# Patient Record
Sex: MALE | Race: ASIAN | NOT HISPANIC OR LATINO
[De-identification: names, ages, dates, MRNs, and addresses within clinical notes are randomized per-mention and may not be internally consistent; named-entity substitution may affect disease eponyms.]

---

## 2021-11-16 PROBLEM — Z00.00 ENCOUNTER FOR PREVENTIVE HEALTH EXAMINATION: Status: ACTIVE | Noted: 2021-11-16

## 2021-11-22 ENCOUNTER — APPOINTMENT (OUTPATIENT)
Dept: HEMATOLOGY ONCOLOGY | Facility: CLINIC | Age: 76
End: 2021-11-22

## 2021-12-08 ENCOUNTER — APPOINTMENT (OUTPATIENT)
Dept: HEMATOLOGY ONCOLOGY | Facility: CLINIC | Age: 76
End: 2021-12-08
Payer: MEDICARE

## 2021-12-08 ENCOUNTER — NON-APPOINTMENT (OUTPATIENT)
Age: 76
End: 2021-12-08

## 2021-12-08 VITALS
RESPIRATION RATE: 16 BRPM | DIASTOLIC BLOOD PRESSURE: 70 MMHG | SYSTOLIC BLOOD PRESSURE: 130 MMHG | HEART RATE: 68 BPM | WEIGHT: 149 LBS | OXYGEN SATURATION: 98 % | HEIGHT: 65 IN | TEMPERATURE: 97.5 F | BODY MASS INDEX: 24.83 KG/M2

## 2021-12-08 DIAGNOSIS — Z80.3 FAMILY HISTORY OF MALIGNANT NEOPLASM OF BREAST: ICD-10-CM

## 2021-12-08 DIAGNOSIS — Z80.0 FAMILY HISTORY OF MALIGNANT NEOPLASM OF DIGESTIVE ORGANS: ICD-10-CM

## 2021-12-08 DIAGNOSIS — Z85.46 PERSONAL HISTORY OF MALIGNANT NEOPLASM OF PROSTATE: ICD-10-CM

## 2021-12-08 PROCEDURE — 85025 COMPLETE CBC W/AUTO DIFF WBC: CPT

## 2021-12-08 PROCEDURE — 99204 OFFICE O/P NEW MOD 45 MIN: CPT

## 2021-12-09 LAB
COVID-19 SPIKE DOMAIN ANTIBODY INTERPRETATION: POSITIVE
ERYTHROCYTE [SEDIMENTATION RATE] IN BLOOD BY WESTERGREN METHOD: 11 MM/HR
SARS-COV-2 AB SERPL IA-ACNC: >250 U/ML

## 2021-12-15 ENCOUNTER — INPATIENT (INPATIENT)
Facility: HOSPITAL | Age: 76
LOS: 0 days | Discharge: HOME | End: 2021-12-16
Attending: HOSPITALIST | Admitting: HOSPITALIST
Payer: MEDICARE

## 2021-12-15 VITALS
TEMPERATURE: 98 F | SYSTOLIC BLOOD PRESSURE: 149 MMHG | HEART RATE: 62 BPM | OXYGEN SATURATION: 99 % | DIASTOLIC BLOOD PRESSURE: 73 MMHG | RESPIRATION RATE: 18 BRPM

## 2021-12-15 DIAGNOSIS — Z29.9 ENCOUNTER FOR PROPHYLACTIC MEASURES, UNSPECIFIED: ICD-10-CM

## 2021-12-15 DIAGNOSIS — K62.5 HEMORRHAGE OF ANUS AND RECTUM: ICD-10-CM

## 2021-12-15 DIAGNOSIS — K92.2 GASTROINTESTINAL HEMORRHAGE, UNSPECIFIED: ICD-10-CM

## 2021-12-15 DIAGNOSIS — C61 MALIGNANT NEOPLASM OF PROSTATE: ICD-10-CM

## 2021-12-15 LAB
ANION GAP SERPL CALC-SCNC: 10 MMOL/L — SIGNIFICANT CHANGE UP (ref 7–14)
ANISOCYTOSIS BLD QL: SLIGHT — SIGNIFICANT CHANGE UP
APTT BLD: 41.1 SEC — HIGH (ref 27–36.3)
BASOPHILS # BLD AUTO: 0 K/UL — SIGNIFICANT CHANGE UP (ref 0–0.2)
BASOPHILS # BLD AUTO: 0.03 K/UL — SIGNIFICANT CHANGE UP (ref 0–0.2)
BASOPHILS NFR BLD AUTO: 0 % — SIGNIFICANT CHANGE UP (ref 0–2)
BASOPHILS NFR BLD AUTO: 0.4 % — SIGNIFICANT CHANGE UP (ref 0–2)
BLD GP AB SCN SERPL QL: NEGATIVE — SIGNIFICANT CHANGE UP
BUN SERPL-MCNC: 14 MG/DL — SIGNIFICANT CHANGE UP (ref 7–23)
CALCIUM SERPL-MCNC: 9.4 MG/DL — SIGNIFICANT CHANGE UP (ref 8.4–10.5)
CHLORIDE SERPL-SCNC: 104 MMOL/L — SIGNIFICANT CHANGE UP (ref 98–107)
CO2 SERPL-SCNC: 25 MMOL/L — SIGNIFICANT CHANGE UP (ref 22–31)
CREAT SERPL-MCNC: 0.79 MG/DL — SIGNIFICANT CHANGE UP (ref 0.5–1.3)
EOSINOPHIL # BLD AUTO: 0.2 K/UL — SIGNIFICANT CHANGE UP (ref 0–0.5)
EOSINOPHIL # BLD AUTO: 0.24 K/UL — SIGNIFICANT CHANGE UP (ref 0–0.5)
EOSINOPHIL NFR BLD AUTO: 2.6 % — SIGNIFICANT CHANGE UP (ref 0–6)
EOSINOPHIL NFR BLD AUTO: 3.4 % — SIGNIFICANT CHANGE UP (ref 0–6)
GLUCOSE SERPL-MCNC: 110 MG/DL — HIGH (ref 70–99)
HCT VFR BLD CALC: 42.8 % — SIGNIFICANT CHANGE UP (ref 39–50)
HCT VFR BLD CALC: 48.9 % — SIGNIFICANT CHANGE UP (ref 39–50)
HGB BLD-MCNC: 15 G/DL — SIGNIFICANT CHANGE UP (ref 13–17)
HGB BLD-MCNC: 16.7 G/DL — SIGNIFICANT CHANGE UP (ref 13–17)
IANC: 3.59 K/UL — SIGNIFICANT CHANGE UP (ref 1.5–8.5)
IANC: 3.7 K/UL — SIGNIFICANT CHANGE UP (ref 1.5–8.5)
IMM GRANULOCYTES NFR BLD AUTO: 0.3 % — SIGNIFICANT CHANGE UP (ref 0–1.5)
INR BLD: 1.05 RATIO — SIGNIFICANT CHANGE UP (ref 0.88–1.16)
LYMPHOCYTES # BLD AUTO: 2.71 K/UL — SIGNIFICANT CHANGE UP (ref 1–3.3)
LYMPHOCYTES # BLD AUTO: 3.45 K/UL — HIGH (ref 1–3.3)
LYMPHOCYTES # BLD AUTO: 38 % — SIGNIFICANT CHANGE UP (ref 13–44)
LYMPHOCYTES # BLD AUTO: 44.4 % — HIGH (ref 13–44)
MACROCYTES BLD QL: SLIGHT — SIGNIFICANT CHANGE UP
MAGNESIUM SERPL-MCNC: 2.1 MG/DL — SIGNIFICANT CHANGE UP (ref 1.6–2.6)
MCHC RBC-ENTMCNC: 32.1 PG — SIGNIFICANT CHANGE UP (ref 27–34)
MCHC RBC-ENTMCNC: 32.4 PG — SIGNIFICANT CHANGE UP (ref 27–34)
MCHC RBC-ENTMCNC: 34.2 GM/DL — SIGNIFICANT CHANGE UP (ref 32–36)
MCHC RBC-ENTMCNC: 35 GM/DL — SIGNIFICANT CHANGE UP (ref 32–36)
MCV RBC AUTO: 91.5 FL — SIGNIFICANT CHANGE UP (ref 80–100)
MCV RBC AUTO: 95 FL — SIGNIFICANT CHANGE UP (ref 80–100)
MONOCYTES # BLD AUTO: 0.55 K/UL — SIGNIFICANT CHANGE UP (ref 0–0.9)
MONOCYTES # BLD AUTO: 0.61 K/UL — SIGNIFICANT CHANGE UP (ref 0–0.9)
MONOCYTES NFR BLD AUTO: 7.7 % — SIGNIFICANT CHANGE UP (ref 2–14)
MONOCYTES NFR BLD AUTO: 7.8 % — SIGNIFICANT CHANGE UP (ref 2–14)
NEUTROPHILS # BLD AUTO: 3.04 K/UL — SIGNIFICANT CHANGE UP (ref 1.8–7.4)
NEUTROPHILS # BLD AUTO: 3.59 K/UL — SIGNIFICANT CHANGE UP (ref 1.8–7.4)
NEUTROPHILS NFR BLD AUTO: 39.1 % — LOW (ref 43–77)
NEUTROPHILS NFR BLD AUTO: 50.2 % — SIGNIFICANT CHANGE UP (ref 43–77)
NRBC # BLD: 0 /100 WBCS — SIGNIFICANT CHANGE UP
NRBC # FLD: 0 K/UL — SIGNIFICANT CHANGE UP
OVALOCYTES BLD QL SMEAR: SLIGHT — SIGNIFICANT CHANGE UP
PHOSPHATE SERPL-MCNC: 3.6 MG/DL — SIGNIFICANT CHANGE UP (ref 2.5–4.5)
PLAT MORPH BLD: NORMAL — SIGNIFICANT CHANGE UP
PLATELET # BLD AUTO: 203 K/UL — SIGNIFICANT CHANGE UP (ref 150–400)
PLATELET # BLD AUTO: 219 K/UL — SIGNIFICANT CHANGE UP (ref 150–400)
PLATELET COUNT - ESTIMATE: NORMAL — SIGNIFICANT CHANGE UP
POLYCHROMASIA BLD QL SMEAR: SLIGHT — SIGNIFICANT CHANGE UP
POTASSIUM SERPL-MCNC: 4.1 MMOL/L — SIGNIFICANT CHANGE UP (ref 3.5–5.3)
POTASSIUM SERPL-SCNC: 4.1 MMOL/L — SIGNIFICANT CHANGE UP (ref 3.5–5.3)
PROTHROM AB SERPL-ACNC: 12 SEC — SIGNIFICANT CHANGE UP (ref 10.6–13.6)
RBC # BLD: 4.68 M/UL — SIGNIFICANT CHANGE UP (ref 4.2–5.8)
RBC # BLD: 5.15 M/UL — SIGNIFICANT CHANGE UP (ref 4.2–5.8)
RBC # FLD: 11.9 % — SIGNIFICANT CHANGE UP (ref 10.3–14.5)
RBC # FLD: 12.1 % — SIGNIFICANT CHANGE UP (ref 10.3–14.5)
RBC BLD AUTO: ABNORMAL
RH IG SCN BLD-IMP: POSITIVE — SIGNIFICANT CHANGE UP
SARS-COV-2 RNA SPEC QL NAA+PROBE: SIGNIFICANT CHANGE UP
SODIUM SERPL-SCNC: 139 MMOL/L — SIGNIFICANT CHANGE UP (ref 135–145)
VARIANT LYMPHS # BLD: 6.1 % — HIGH (ref 0–6)
WBC # BLD: 7.14 K/UL — SIGNIFICANT CHANGE UP (ref 3.8–10.5)
WBC # BLD: 7.77 K/UL — SIGNIFICANT CHANGE UP (ref 3.8–10.5)
WBC # FLD AUTO: 7.14 K/UL — SIGNIFICANT CHANGE UP (ref 3.8–10.5)
WBC # FLD AUTO: 7.77 K/UL — SIGNIFICANT CHANGE UP (ref 3.8–10.5)

## 2021-12-15 PROCEDURE — 74174 CTA ABD&PLVS W/CONTRAST: CPT | Mod: 26,MG

## 2021-12-15 PROCEDURE — G1004: CPT

## 2021-12-15 PROCEDURE — 99223 1ST HOSP IP/OBS HIGH 75: CPT

## 2021-12-15 PROCEDURE — 99285 EMERGENCY DEPT VISIT HI MDM: CPT

## 2021-12-15 RX ORDER — LANOLIN ALCOHOL/MO/W.PET/CERES
3 CREAM (GRAM) TOPICAL AT BEDTIME
Refills: 0 | Status: DISCONTINUED | OUTPATIENT
Start: 2021-12-15 | End: 2021-12-16

## 2021-12-15 RX ORDER — ONDANSETRON 8 MG/1
4 TABLET, FILM COATED ORAL EVERY 8 HOURS
Refills: 0 | Status: DISCONTINUED | OUTPATIENT
Start: 2021-12-15 | End: 2021-12-16

## 2021-12-15 RX ORDER — SODIUM CHLORIDE 9 MG/ML
1000 INJECTION, SOLUTION INTRAVENOUS
Refills: 0 | Status: DISCONTINUED | OUTPATIENT
Start: 2021-12-16 | End: 2021-12-16

## 2021-12-15 RX ORDER — ACETAMINOPHEN 500 MG
650 TABLET ORAL EVERY 6 HOURS
Refills: 0 | Status: DISCONTINUED | OUTPATIENT
Start: 2021-12-15 | End: 2021-12-16

## 2021-12-15 NOTE — ED PROVIDER NOTE - ATTENDING CONTRIBUTION TO CARE
76 year old male with a history of prostate ca came to the ED because of bloody BM since yesterday. NO fever, no chest pain, no sob, no nausea, no vomiting, no abd pain, no back pain. On exam: vitals noted,  aaox3, lungs cta, abd soft, nt, labs and CTA pending.

## 2021-12-15 NOTE — H&P ADULT - NSHPLABSRESULTS_GEN_ALL_CORE
12-15    139  |  104  |  14  ----------------------------<  110<H>  4.1   |  25  |  0.79    Ca    9.4      15 Dec 2021 10:58  Phos  3.6     12-15  Mg     2.10     12-15                       15.0   7.77  )-----------( 203      ( 15 Dec 2021 16:17 )             42.8   PT/INR - ( 15 Dec 2021 10:58 )   PT: 12.0 sec;   INR: 1.05 ratio      PTT - ( 15 Dec 2021 10:58 )  PTT:41.1 sec    EKG Pending

## 2021-12-15 NOTE — ED ADULT NURSE REASSESSMENT NOTE - NS ED NURSE REASSESS COMMENT FT1
received report from Rn Jesus Alberto. pt resting comfortably. denies any acute complaints. states he has had no evidence of rectal bleeding since arrival to ED. resps are even and unlabored, spo2 100%.

## 2021-12-15 NOTE — ED ADULT TRIAGE NOTE - CHIEF COMPLAINT QUOTE
states" I am having blood in my stools since last night with pain in my rectum". denies use of any blood thinners.

## 2021-12-15 NOTE — H&P ADULT - PROBLEM SELECTOR PLAN 1
Likely diverticular vs angiodysplasia  -NPO midnight  -GI consult to follow  -Clear liquids for now  -IVF after midnight  -H/H q8H

## 2021-12-15 NOTE — H&P ADULT - HISTORY OF PRESENT ILLNESS
77yo male with PMH of prostate cancer (radiation/seeds 2001 - no seq) presents to the ED with blood in stool since last night. Pt reports that the blood is bright red and had clots x 2 times last night and this AM has another 2 BMs with less red blood. Bleeding only occurs during bowl movements. Pt is not on blood thinners. Pt denies h/o of hemorrhoids. Denies nausea, vomiting, fever, abdominal pain or rectal pain, lightheadedness and syncope. Last colonoscopy was 2 years ago. Unsure if he had diverticulosis last scope.  In the ED, VSS. Hgb stable. CTA without acute bleed.

## 2021-12-15 NOTE — ED PROVIDER NOTE - CLINICAL SUMMARY MEDICAL DECISION MAKING FREE TEXT BOX
75yo male with PMH of prostate exam presents to the ED with multiple episodes of heavy rectal bleeding.  Likely lower GI bleed, r/o active bleed  Labs: CBC, CMP, coags, type and screen  imaging: CTA

## 2021-12-15 NOTE — ED PROVIDER NOTE - OBJECTIVE STATEMENT
75yo male with PMH of prostate cancer presents to the ED with blood in stool since last night. Pt reports that the blood is bright red and has clots. Pt has had 4 bowl movements since last night. According to him, his first two movements had a copious amount of blood and the subsequent two had much less. Bleeding only occurs during bowl movements. Pt is not on blood thinners. Pt denies h/o of hemorrhoids. Denies nausea, vomiting, fever, abdominal pain or rectal pain. 75yo male with PMH of prostate cancer (radiation/seeds 2001 - no seq) presents to the ED with blood in stool since last night. Pt reports that the blood is bright red and has clots. Pt has had 4 bowl movements since last night. According to him, his first two movements had a copious amount of blood and the subsequent two had much less. Bleeding only occurs during bowl movements. Pt is not on blood thinners. Pt denies h/o of hemorrhoids. Denies nausea, vomiting, fever, abdominal pain or rectal pain.

## 2021-12-15 NOTE — ED ADULT NURSE NOTE - OBJECTIVE STATEMENT
Received pt to bed , A+Ox4, ambulatory. C/O bleeding, pt reports bright red blood, from rectum since last night with bowel movements, pt states with every bowel movement (4) bleeding has gotten less, now only when wiping. pt endorsing weakness since last night. Respirations even and unlabored, normal work of breathing, no accessory muscle use, speaking in full clear uninterrupted sentences. ABD is soft, non tender, non distended. Pt denies any chest pain, SOB, headache, dizziness, N/V/D, fever, chills.  20G to LAC, Labs sent, will continue to monitor.

## 2021-12-15 NOTE — H&P ADULT - ASSESSMENT
75yo male with PMH of prostate cancer (radiation/seeds 2001 - no seq) presents to the ED with blood in stool since last night now admitted for acute LGIB.

## 2021-12-15 NOTE — H&P ADULT - NSHPREVIEWOFSYSTEMS_GEN_ALL_CORE
REVIEW OF SYSTEMS:    CONSTITUTIONAL: No weakness, fevers or chills  EYES/ENT: No visual changes;  No dysphagia  NECK: No pain or stiffness  RESPIRATORY: No cough, wheezing, hemoptysis; No shortness of breath  CARDIOVASCULAR: No chest pain or palpitations; No lower extremity edema  GASTROINTESTINAL: No abdominal or epigastric pain. No nausea, vomiting, or hematemesis; No diarrhea or constipation. +hematochezia.  GENITOURINARY: No dysuria, frequency or hematuria  NEUROLOGICAL: No numbness or weakness  SKIN: No itching, burning, rashes, or lesions   PSYCH: No auditory or visual hallucinations  All other review of systems is negative unless indicated above.

## 2021-12-15 NOTE — PATIENT PROFILE ADULT - FALL HARM RISK - HARM RISK INTERVENTIONS

## 2021-12-15 NOTE — ED ADULT NURSE NOTE - NSIMPLEMENTINTERV_GEN_ALL_ED
Implemented All Universal Safety Interventions:  [place] to call system. Call bell, personal items and telephone within reach. Instruct patient to call for assistance. Room bathroom lighting operational. Non-slip footwear when patient is off stretcher. Physically safe environment: no spills, clutter or unnecessary equipment. Stretcher in lowest position, wheels locked, appropriate side rails in place.

## 2021-12-16 ENCOUNTER — TRANSCRIPTION ENCOUNTER (OUTPATIENT)
Age: 76
End: 2021-12-16

## 2021-12-16 LAB
ALBUMIN SERPL ELPH-MCNC: 4 G/DL — SIGNIFICANT CHANGE UP (ref 3.3–5)
ALP SERPL-CCNC: 49 U/L — SIGNIFICANT CHANGE UP (ref 40–120)
ALT FLD-CCNC: 19 U/L — SIGNIFICANT CHANGE UP (ref 4–41)
ANION GAP SERPL CALC-SCNC: 12 MMOL/L — SIGNIFICANT CHANGE UP (ref 7–14)
AST SERPL-CCNC: 20 U/L — SIGNIFICANT CHANGE UP (ref 4–40)
BILIRUB SERPL-MCNC: 0.7 MG/DL — SIGNIFICANT CHANGE UP (ref 0.2–1.2)
BUN SERPL-MCNC: 11 MG/DL — SIGNIFICANT CHANGE UP (ref 7–23)
CALCIUM SERPL-MCNC: 8.8 MG/DL — SIGNIFICANT CHANGE UP (ref 8.4–10.5)
CHLORIDE SERPL-SCNC: 103 MMOL/L — SIGNIFICANT CHANGE UP (ref 98–107)
CO2 SERPL-SCNC: 21 MMOL/L — LOW (ref 22–31)
CREAT SERPL-MCNC: 0.74 MG/DL — SIGNIFICANT CHANGE UP (ref 0.5–1.3)
GLUCOSE SERPL-MCNC: 102 MG/DL — HIGH (ref 70–99)
HCT VFR BLD CALC: 42.1 % — SIGNIFICANT CHANGE UP (ref 39–50)
HCV AB S/CO SERPL IA: 0.16 S/CO — SIGNIFICANT CHANGE UP (ref 0–0.99)
HCV AB SERPL-IMP: SIGNIFICANT CHANGE UP
HGB BLD-MCNC: 15 G/DL — SIGNIFICANT CHANGE UP (ref 13–17)
INR BLD: 1.09 RATIO — SIGNIFICANT CHANGE UP (ref 0.88–1.16)
MAGNESIUM SERPL-MCNC: 2 MG/DL — SIGNIFICANT CHANGE UP (ref 1.6–2.6)
MCHC RBC-ENTMCNC: 32.3 PG — SIGNIFICANT CHANGE UP (ref 27–34)
MCHC RBC-ENTMCNC: 35.6 GM/DL — SIGNIFICANT CHANGE UP (ref 32–36)
MCV RBC AUTO: 90.7 FL — SIGNIFICANT CHANGE UP (ref 80–100)
NRBC # BLD: 0 /100 WBCS — SIGNIFICANT CHANGE UP
NRBC # FLD: 0 K/UL — SIGNIFICANT CHANGE UP
PLATELET # BLD AUTO: 200 K/UL — SIGNIFICANT CHANGE UP (ref 150–400)
POTASSIUM SERPL-MCNC: 3.9 MMOL/L — SIGNIFICANT CHANGE UP (ref 3.5–5.3)
POTASSIUM SERPL-SCNC: 3.9 MMOL/L — SIGNIFICANT CHANGE UP (ref 3.5–5.3)
PROT SERPL-MCNC: 6.4 G/DL — SIGNIFICANT CHANGE UP (ref 6–8.3)
PROTHROM AB SERPL-ACNC: 12.5 SEC — SIGNIFICANT CHANGE UP (ref 10.6–13.6)
RBC # BLD: 4.64 M/UL — SIGNIFICANT CHANGE UP (ref 4.2–5.8)
RBC # FLD: 12 % — SIGNIFICANT CHANGE UP (ref 10.3–14.5)
SODIUM SERPL-SCNC: 136 MMOL/L — SIGNIFICANT CHANGE UP (ref 135–145)
WBC # BLD: 7.68 K/UL — SIGNIFICANT CHANGE UP (ref 3.8–10.5)
WBC # FLD AUTO: 7.68 K/UL — SIGNIFICANT CHANGE UP (ref 3.8–10.5)

## 2021-12-16 PROCEDURE — 99222 1ST HOSP IP/OBS MODERATE 55: CPT | Mod: GC

## 2021-12-16 PROCEDURE — 99233 SBSQ HOSP IP/OBS HIGH 50: CPT

## 2021-12-16 RX ORDER — MULTIVIT-MIN/FERROUS GLUCONATE 9 MG/15 ML
1 LIQUID (ML) ORAL
Qty: 0 | Refills: 0 | DISCHARGE

## 2021-12-16 RX ADMIN — SODIUM CHLORIDE 75 MILLILITER(S): 9 INJECTION, SOLUTION INTRAVENOUS at 00:18

## 2021-12-16 RX ADMIN — Medication 3 MILLIGRAM(S): at 23:22

## 2021-12-16 RX ADMIN — Medication 3 MILLIGRAM(S): at 00:17

## 2021-12-16 NOTE — CONSULT NOTE ADULT - ATTENDING COMMENTS
Patient seen/examined. History/PE as noted. Wife at bedside. Prior history noted for prostate cancer status post external beam radiation therapy as well as radioactive seed implantation. Baseline bowel habit ever since radiation reported as approximately 5 or 6 small formed bowel movements daily. On 12/14 had several episodes of hematochezia described as just passage of red blood and some clots independent of moving bowels. Experienced recurrence 12/15 but today so far no further hematochezia with bowel movements except just scant red blood on toilet paper after wiping. No anal pain with defecation. Denies abdominal pain, nausea, vomiting reports no postural symptoms. PE/labs as noted. As indicated has stable hemoglobin. Suspect current episode of hematochezia of distal anorectal origin possibly from radiation proctopathy. Diverticulosis noted on CT and possibly diverticular although suspect less likely. Less likely neoplastic process in view of reported normal colonoscopy 2 years ago per patient but have advised to have followup colonoscopy for full evaluation. Colonoscopy discussed at length with patient and wife and rationale for evaluation reviewed. Option of inpatient colonoscopy on 12/17 discussed with patient. He does not want to pursue colonoscopy as inpatient and he indicates he will pursue GI followup as outpatient.

## 2021-12-16 NOTE — CONSULT NOTE ADULT - ASSESSMENT
76 year old male with history of prostate cancer s/p radiation and brachytherapy [2001] who presents with BRBPR.    # Hematochezia  # History of prostate cancer s/p radiation and brachytherapy [2001]  Acute development of BRBPR on the evening of 12/14/2021 with multiple bouts of hematochezia with clots, not associated with stool.  The frequency of this decreased over the next 1-2 days and was further evaluated at [facility].  Colonoscopy with his private GI physician Dr. Angelo in 2019 that was reportedly normal.  History of prostate cancer which he received radiation and brachytherapy, however no treatments since 2001.  Upon admission, Hg >15, BUN within normal limits, and CT A/P upon admission negative for active GI bleeding however diverticulosis noted.    Clinical presentation most consistent with diverticulosis versus radiation proctopathy, however differential diagnosis of lower GI bleeding includes angioectasias, malignancy, hemorrhoids, or colitis.    Recommendations:  -trend vitals, CBC, and monitor for clinical signs of bleeding  -maintain active type and screen  -rule out other causes for anemia [consider iron studies, ferritin, vitamin B12, folate]  -transfusion goal to maintain hemoglobin >/= 7.0 and platelets >/= 50  -if patient amenable, will order colon prep, clear liquid diet today and please keep NPO at midnight for tentative colonoscopy      Chriss Bolton, PGY-4  GI/Hepatology Fellow    MONDAY-FRIDAY 8AM-5PM:  Pager# [phone] ([facility]) or [phone] ([facility])  GI Phone# [phone] ([facility])    NON-URGENT CONSULTS:  Please email [email or web address]  AT NIGHT AND ON WEEKENDS:  Contact on-call GI fellow via answering service ([phone]) from 5pm-8am and on weekends/holidays

## 2021-12-16 NOTE — DISCHARGE NOTE PROVIDER - NSDCCPCAREPLAN_GEN_ALL_CORE_FT
PRINCIPAL DISCHARGE DIAGNOSIS  Diagnosis: Rectal bleeding  Assessment and Plan of Treatment: You were seen by Gastroenterology in-hospital and monitored closely with improvement. Follow up outpatient gastroenterologist (Dr. Angelo) in 1-2 weeks for further management and colonoscopy as warranted.      SECONDARY DISCHARGE DIAGNOSES  Diagnosis: Prostate CA  Assessment and Plan of Treatment: Follow up outpatient oncoloigst for further management.

## 2021-12-16 NOTE — DISCHARGE NOTE PROVIDER - HOSPITAL COURSE
76 M PMHx prostate cancer S/P RT/seeds (2001) p/w blood in stool since last night. GI consulted. Pt monitored with improvement.     Spoke with attending, Dr. Pierce, pt is medically stable for discharge to home          76 M PMHx prostate cancer S/P RT/seeds (2001) p/w blood in stool since last night. GI consulted. CT A/P without GI bleed, diverticulosis. Pt monitored with improvement.     Spoke with attending, Dr. Pierce, pt is medically stable for discharge to home

## 2021-12-16 NOTE — DISCHARGE NOTE PROVIDER - PROVIDER TOKENS
FREE:[LAST:[Dr. Angelo],PHONE:[(   )    -],FAX:[(   )    -],ADDRESS:[Gastroenterologist]] FREE:[LAST:[Dr. Angelo],PHONE:[(   )    -],FAX:[(   )    -],ADDRESS:[Gastroenterologist]],PROVIDER:[TOKEN:[[id number]]]

## 2021-12-16 NOTE — DISCHARGE NOTE PROVIDER - CARE PROVIDER_API CALL
Dr. Angelo,   Gastroenterologist  Phone: (   )    -  Fax: (   )    -  Follow Up Time:    Dr. Angelo,   Gastroenterologist  Phone: (   )    -  Fax: (   )    -  Follow Up Time:     Joseph Alston)  Gastroenterology; Internal Medicine  [address] [place] [address], [facility] [address]  [place] [address]  Phone: [phone]  Fax: [phone]  Follow Up Time:

## 2021-12-16 NOTE — CONSULT NOTE ADULT - SUBJECTIVE AND OBJECTIVE BOX
HPI:  JEROME DAVIES is a 76 year old male with history of prostate cancer s/p radiation and brachytherapy [2001] who presents with BRBPR.    Patient states he acutely developed BRBPR on the evening of 12/14/2021.  He had multiple bouts of hematochezia with clots, not associated with stool.  The frequency of this decreased over the next 1-2 days and was further evaluated at [facility].  He states he had a colonoscopy with his private GI physician Dr. Angelo in 2019 that was reportedly normal.  He has a history of prostate cancer which he received radiation and brachytherapy, however no treatments since 2001.  Hg >15, BUN within normal limits, and CT A/P upon admission negative for active GI bleeding however diverticulosis noted.  Otherwise, patient denies fevers, chills, weight loss, dysphagia, odynophagia, early satiety, poor oral intake, abdominal pain, nausea, vomiting, diarrhea, melena, hematemesis.    ROS:   General:  No fevers, chills, night sweats  Eyes:  Good vision, no reported pain  ENT:  No sore throat, pain, runny nose  CV:  No pain, palpitations  Pulm:  No dyspnea, cough  GI:  See HPI, otherwise negative  :  No incontinence, nocturia  Muscle:  No reported pain, weakness  Neuro:  No memory problems  Psych:  No insomnia, psychosis  Endocrine:  No polyuria, polydipsia  Heme:  No petechiae, ecchymosis, easy bruisability  Skin:  No reported rash    PMHX/PSHX:    Prostate CA    No significant past surgical history      Allergies:  No Known Allergies      Home Medications: reviewed  Hospital Medications:  acetaminophen     Tablet .. 650 milliGRAM(s) Oral every 6 hours PRN  aluminum hydroxide/magnesium hydroxide/simethicone Suspension 30 milliLiter(s) Oral every 4 hours PRN  lactated ringers. 1000 milliLiter(s) IV Continuous <Continuous>  melatonin 3 milliGRAM(s) Oral at bedtime PRN  ondansetron Injectable 4 milliGRAM(s) IV Push every 8 hours PRN      Social History:   Tobacco: Denies  EtOH: Denies  Illicit Drugs: Denies    Family history:    No pertinent family history in first degree relatives      Denies family history of colon cancer/polyps, stomach cancer/polyps, pancreatic cancer/masses, liver cancer/disease, ovarian cancer and endometrial cancer.    PHYSICAL EXAM:   Vital Signs:  Vital Signs Last 24 Hrs  T(C): 36.4 (16 Dec 2021 05:44), Max: 36.8 (15 Dec 2021 15:12)  T(F): 97.5 (16 Dec 2021 05:44), Max: 98.3 (15 Dec 2021 15:12)  HR: 51 (16 Dec 2021 05:15) (51 - 58)  BP: 123/67 (16 Dec 2021 05:15) (123/67 - 148/80)  BP(mean): --  RR: 18 (16 Dec 2021 05:15) (18 - 18)  SpO2: 99% (16 Dec 2021 05:15) (97% - 99%)  Daily Height in cm: 165.1 (15 Dec 2021 23:35)    Daily     GENERAL: no acute distress  NEURO: alert  HEENT: anicteric sclera, no conjunctival pallor appreciated  CHEST: no respiratory distress, no accessory muscle use  CARDIAC: regular rate, +S1/S2  ABDOMEN: soft, nondistended, nontender, no rebound or guarding  RECTAL: scant brown stool noted  EXTREMITIES: warm, well perfused, no edema  SKIN: no lesions noted    LABS: reviewed                        15.0   7.68  )-----------( 200      ( 16 Dec 2021 07:03 )             42.1     12-16    136  |  103  |  11  ----------------------------<  102<H>  3.9   |  21<L>  |  0.74    Ca    8.8      16 Dec 2021 07:03  Phos  3.6     12-15  Mg     2.00     12-16    TPro  6.4  /  Alb  4.0  /  TBili  0.7  /  DBili  x   /  AST  20  /  ALT  19  /  AlkPhos  49  12-16    LIVER FUNCTIONS - ( 16 Dec 2021 07:03 )  Alb: 4.0 g/dL / Pro: 6.4 g/dL / ALK PHOS: 49 U/L / ALT: 19 U/L / AST: 20 U/L / GGT: x               Diagnostic Studies: see sunrise for full report

## 2021-12-16 NOTE — DISCHARGE NOTE PROVIDER - CARE PROVIDERS DIRECT ADDRESSES
,DirectAddress_Unknown ,DirectAddress_Unknown,[email or web address]

## 2021-12-17 ENCOUNTER — TRANSCRIPTION ENCOUNTER (OUTPATIENT)
Age: 76
End: 2021-12-17

## 2021-12-17 VITALS
DIASTOLIC BLOOD PRESSURE: 68 MMHG | SYSTOLIC BLOOD PRESSURE: 119 MMHG | OXYGEN SATURATION: 98 % | RESPIRATION RATE: 18 BRPM | HEART RATE: 69 BPM | TEMPERATURE: 98 F

## 2021-12-17 DIAGNOSIS — K92.1 MELENA: ICD-10-CM

## 2021-12-17 LAB
ALBUMIN SERPL ELPH-MCNC: 4.2 G/DL — SIGNIFICANT CHANGE UP (ref 3.3–5)
ALP SERPL-CCNC: 51 U/L — SIGNIFICANT CHANGE UP (ref 40–120)
ALT FLD-CCNC: 16 U/L — SIGNIFICANT CHANGE UP (ref 4–41)
ANION GAP SERPL CALC-SCNC: 12 MMOL/L — SIGNIFICANT CHANGE UP (ref 7–14)
APTT BLD: 36.4 SEC — HIGH (ref 27–36.3)
AST SERPL-CCNC: 19 U/L — SIGNIFICANT CHANGE UP (ref 4–40)
BILIRUB SERPL-MCNC: 0.5 MG/DL — SIGNIFICANT CHANGE UP (ref 0.2–1.2)
BLD GP AB SCN SERPL QL: NEGATIVE — SIGNIFICANT CHANGE UP
BUN SERPL-MCNC: 14 MG/DL — SIGNIFICANT CHANGE UP (ref 7–23)
CALCIUM SERPL-MCNC: 9.1 MG/DL — SIGNIFICANT CHANGE UP (ref 8.4–10.5)
CHLORIDE SERPL-SCNC: 103 MMOL/L — SIGNIFICANT CHANGE UP (ref 98–107)
CO2 SERPL-SCNC: 23 MMOL/L — SIGNIFICANT CHANGE UP (ref 22–31)
CREAT SERPL-MCNC: 0.77 MG/DL — SIGNIFICANT CHANGE UP (ref 0.5–1.3)
FERRITIN SERPL-MCNC: 324 NG/ML — SIGNIFICANT CHANGE UP (ref 30–400)
FOLATE SERPL-MCNC: 19.9 NG/ML — HIGH (ref 3.1–17.5)
GLUCOSE SERPL-MCNC: 103 MG/DL — HIGH (ref 70–99)
HCT VFR BLD CALC: 44.7 % — SIGNIFICANT CHANGE UP (ref 39–50)
HGB BLD-MCNC: 15.3 G/DL — SIGNIFICANT CHANGE UP (ref 13–17)
INR BLD: 1.08 RATIO — SIGNIFICANT CHANGE UP (ref 0.88–1.16)
IRON SATN MFR SERPL: 21 % — SIGNIFICANT CHANGE UP (ref 14–50)
IRON SATN MFR SERPL: 53 UG/DL — SIGNIFICANT CHANGE UP (ref 45–165)
MAGNESIUM SERPL-MCNC: 2.1 MG/DL — SIGNIFICANT CHANGE UP (ref 1.6–2.6)
MCHC RBC-ENTMCNC: 32.1 PG — SIGNIFICANT CHANGE UP (ref 27–34)
MCHC RBC-ENTMCNC: 34.2 GM/DL — SIGNIFICANT CHANGE UP (ref 32–36)
MCV RBC AUTO: 93.7 FL — SIGNIFICANT CHANGE UP (ref 80–100)
NRBC # BLD: 0 /100 WBCS — SIGNIFICANT CHANGE UP
NRBC # FLD: 0 K/UL — SIGNIFICANT CHANGE UP
PHOSPHATE SERPL-MCNC: 3.7 MG/DL — SIGNIFICANT CHANGE UP (ref 2.5–4.5)
PLATELET # BLD AUTO: 201 K/UL — SIGNIFICANT CHANGE UP (ref 150–400)
POTASSIUM SERPL-MCNC: 3.9 MMOL/L — SIGNIFICANT CHANGE UP (ref 3.5–5.3)
POTASSIUM SERPL-SCNC: 3.9 MMOL/L — SIGNIFICANT CHANGE UP (ref 3.5–5.3)
PROT SERPL-MCNC: 6.9 G/DL — SIGNIFICANT CHANGE UP (ref 6–8.3)
PROTHROM AB SERPL-ACNC: 12.3 SEC — SIGNIFICANT CHANGE UP (ref 10.6–13.6)
RBC # BLD: 4.77 M/UL — SIGNIFICANT CHANGE UP (ref 4.2–5.8)
RBC # FLD: 11.8 % — SIGNIFICANT CHANGE UP (ref 10.3–14.5)
RH IG SCN BLD-IMP: POSITIVE — SIGNIFICANT CHANGE UP
SODIUM SERPL-SCNC: 138 MMOL/L — SIGNIFICANT CHANGE UP (ref 135–145)
TIBC SERPL-MCNC: 258 UG/DL — SIGNIFICANT CHANGE UP (ref 220–430)
UIBC SERPL-MCNC: 205 UG/DL — SIGNIFICANT CHANGE UP (ref 110–370)
VIT B12 SERPL-MCNC: 707 PG/ML — SIGNIFICANT CHANGE UP (ref 200–900)
WBC # BLD: 8.01 K/UL — SIGNIFICANT CHANGE UP (ref 3.8–10.5)
WBC # FLD AUTO: 8.01 K/UL — SIGNIFICANT CHANGE UP (ref 3.8–10.5)

## 2021-12-17 NOTE — DISCHARGE NOTE NURSING/CASE MANAGEMENT/SOCIAL WORK - PATIENT PORTAL LINK FT
You can access the FollowMyHealth Patient Portal offered by [facility] by registering at the following website: [email or web address]. By joining [facility]’s FollowMyHealth portal, you will also be able to view your health information using other applications (apps) compatible with our system.

## 2021-12-17 NOTE — PROGRESS NOTE ADULT - PROBLEM SELECTOR PLAN 2
No lovenox SC while w/ GI bleed.  - continue VTE ppx
No lovenox SC while w/ GI bleed.  - continue VTE ppx

## 2021-12-17 NOTE — PROGRESS NOTE ADULT - SUBJECTIVE AND OBJECTIVE BOX
Interval Events:   No acute events overnight.  Patient denies any acute symptoms at this time.  No overt bleeding overnight.    ROS:   12 point review of systems performed and negative except otherwise noted in  HPI.    Hospital Medications:  acetaminophen     Tablet .. 650 milliGRAM(s) Oral every 6 hours PRN  aluminum hydroxide/magnesium hydroxide/simethicone Suspension 30 milliLiter(s) Oral every 4 hours PRN  melatonin 3 milliGRAM(s) Oral at bedtime PRN  ondansetron Injectable 4 milliGRAM(s) IV Push every 8 hours PRN      PHYSICAL EXAM:   Vital Signs:  Vital Signs Last 24 Hrs  T(C): 36.3 (17 Dec 2021 05:43), Max: 36.6 (16 Dec 2021 20:23)  T(F): 97.4 (17 Dec 2021 05:43), Max: 97.8 (16 Dec 2021 20:23)  HR: 52 (17 Dec 2021 05:43) (52 - 70)  BP: 111/62 (17 Dec 2021 05:43) (111/62 - 133/83)  BP(mean): --  RR: 18 (17 Dec 2021 05:43) (17 - 18)  SpO2: 98% (17 Dec 2021 05:43) (95% - 100%)  Daily     Daily     GENERAL: no acute distress  NEURO: alert  HEENT: anicteric sclera, no conjunctival pallor appreciated  CHEST: no respiratory distress, no accessory muscle use  CARDIAC: regular rate, +S1/S2  ABDOMEN: soft, nondistended, nontender, no rebound or guarding  EXTREMITIES: warm, well perfused, no edema  SKIN: no lesions noted    LABS: reviewed                        15.3   8.01  )-----------( 201      ( 17 Dec 2021 07:04 )             44.7     12-17    138  |  103  |  14  ----------------------------<  103<H>  3.9   |  23  |  0.77    Ca    9.1      17 Dec 2021 07:04  Phos  3.7     12-17  Mg     2.10     12-17    TPro  6.9  /  Alb  4.2  /  TBili  0.5  /  DBili  x   /  AST  19  /  ALT  16  /  AlkPhos  51  12-17    LIVER FUNCTIONS - ( 17 Dec 2021 07:04 )  Alb: 4.2 g/dL / Pro: 6.9 g/dL / ALK PHOS: 51 U/L / ALT: 16 U/L / AST: 19 U/L / GGT: x             Interval Diagnostic Studies: see sunrise for full report  
[facility] Division of Hospital Medicine  Stan Marks MD  Pager (RODNEY-F, 8A-5P): [phone]  Other Times:  [phone]    Patient is a 76y old  Male who presents with a chief complaint of Bloody diarrhea (16 Dec 2021 10:37)    SUBJECTIVE / OVERNIGHT EVENTS:  Patient states that he had still small amount of bleeding with BM this AM.  No F/C, N/V, CP, SOB, Cough, lightheadedness, dizziness, abdominal pain, diarrhea, dysuria.    MEDICATIONS  (STANDING):  lactated ringers. 1000 milliLiter(s) (75 mL/Hr) IV Continuous <Continuous>    MEDICATIONS  (PRN):  acetaminophen     Tablet .. 650 milliGRAM(s) Oral every 6 hours PRN Temp greater or equal to 38C (100.4F), Mild Pain (1 - 3)  aluminum hydroxide/magnesium hydroxide/simethicone Suspension 30 milliLiter(s) Oral every 4 hours PRN Dyspepsia  melatonin 3 milliGRAM(s) Oral at bedtime PRN Insomnia  ondansetron Injectable 4 milliGRAM(s) IV Push every 8 hours PRN Nausea and/or Vomiting      Vital Signs Last 24 Hrs  T(C): 36.5 (16 Dec 2021 12:05), Max: 36.8 (15 Dec 2021 15:12)  T(F): 97.7 (16 Dec 2021 12:05), Max: 98.3 (15 Dec 2021 15:12)  HR: 64 (16 Dec 2021 12:05) (51 - 64)  BP: 122/77 (16 Dec 2021 12:05) (122/77 - 148/80)  BP(mean): --  RR: 17 (16 Dec 2021 12:05) (17 - 18)  SpO2: 100% (16 Dec 2021 12:05) (97% - 100%)  CAPILLARY BLOOD GLUCOSE        I&O's Summary      PHYSICAL EXAM:  CONSTITUTIONAL: NAD, well-developed, well-groomed  EYES: PERRLA; conjunctiva and sclera clear  ENMT: Moist oral mucosa, no pharyngeal injection or exudates; normal dentition  NECK: Supple, no palpable masses; no thyromegaly  RESPIRATORY: Normal respiratory effort; lungs are clear to auscultation bilaterally  CARDIOVASCULAR: Regular rate and rhythm, normal S1 and S2, no murmur/rub/gallop; No lower extremity edema; Peripheral pulses are 2+ bilaterally  ABDOMEN: Nontender to palpation, normoactive bowel sounds, no rebound/guarding; No hepatosplenomegaly  MUSCULOSKELETAL:  didn't assess gait; no clubbing or cyanosis of digits; no joint swelling or tenderness to palpation  PSYCH: A+O to person, place, and time; affect appropriate  NEUROLOGY: CN 2-12 are intact and symmetric; no gross sensory deficits   SKIN: No rashes; no palpable lesions    LABS:                        15.0   7.68  )-----------( 200      ( 16 Dec 2021 07:03 )             42.1     12-16    136  |  103  |  11  ----------------------------<  102<H>  3.9   |  21<L>  |  0.74    Ca    8.8      16 Dec 2021 07:03  Phos  3.6     12-15  Mg     2.00     12-16    TPro  6.4  /  Alb  4.0  /  TBili  0.7  /  DBili  x   /  AST  20  /  ALT  19  /  AlkPhos  49  12-16    PT/INR - ( 16 Dec 2021 07:03 )   PT: 12.5 sec;   INR: 1.09 ratio         PTT - ( 15 Dec 2021 10:58 )  PTT:41.1 sec          RADIOLOGY & ADDITIONAL TESTS:    Imaging Personally Reviewed:    Care Discussed with Consultants/Other Providers:  
[facility] Division of Hospital Medicine  Stan Marks MD  Pager (RODNEY-F, 8A-5P): [phone]  Other Times:  [phone]    Patient is a 76y old  Male who presents with a chief complaint of Bloody diarrhea (17 Dec 2021 09:40)    SUBJECTIVE / OVERNIGHT EVENTS:  Patient offers no new complaints.  No F/C, N/V, CP, SOB, Cough, lightheadedness, dizziness, abdominal pain, diarrhea, dysuria.    MEDICATIONS  (STANDING):    MEDICATIONS  (PRN):  acetaminophen     Tablet .. 650 milliGRAM(s) Oral every 6 hours PRN Temp greater or equal to 38C (100.4F), Mild Pain (1 - 3)  aluminum hydroxide/magnesium hydroxide/simethicone Suspension 30 milliLiter(s) Oral every 4 hours PRN Dyspepsia  melatonin 3 milliGRAM(s) Oral at bedtime PRN Insomnia  ondansetron Injectable 4 milliGRAM(s) IV Push every 8 hours PRN Nausea and/or Vomiting      Vital Signs Last 24 Hrs  T(C): 36.3 (17 Dec 2021 05:43), Max: 36.6 (16 Dec 2021 20:23)  T(F): 97.4 (17 Dec 2021 05:43), Max: 97.8 (16 Dec 2021 20:23)  HR: 52 (17 Dec 2021 05:43) (52 - 70)  BP: 111/62 (17 Dec 2021 05:43) (111/62 - 133/83)  BP(mean): --  RR: 18 (17 Dec 2021 05:43) (18 - 18)  SpO2: 98% (17 Dec 2021 05:43) (95% - 98%)  CAPILLARY BLOOD GLUCOSE        I&O's Summary      PHYSICAL EXAM:  CONSTITUTIONAL: NAD, well-developed, well-groomed  EYES: PERRLA; conjunctiva and sclera clear  ENMT: Moist oral mucosa, no pharyngeal injection or exudates; normal dentition  NECK: Supple, no palpable masses; no thyromegaly  RESPIRATORY: Normal respiratory effort; lungs are clear to auscultation bilaterally  CARDIOVASCULAR: Regular rate and rhythm, normal S1 and S2, no murmur/rub/gallop; No lower extremity edema; Peripheral pulses are 2+ bilaterally  ABDOMEN: Nontender to palpation, normoactive bowel sounds, no rebound/guarding; No hepatosplenomegaly  MUSCULOSKELETAL:  didn't assess gait; no clubbing or cyanosis of digits; no joint swelling or tenderness to palpation  PSYCH: A+O to person, place, and time; affect appropriate  NEUROLOGY: CN 2-12 are intact and symmetric; no gross sensory deficits   SKIN: No rashes; no palpable lesions    LABS:                        15.3   8.01  )-----------( 201      ( 17 Dec 2021 07:04 )             44.7     12-17    138  |  103  |  14  ----------------------------<  103<H>  3.9   |  23  |  0.77    Ca    9.1      17 Dec 2021 07:04  Phos  3.7     12-17  Mg     2.10     12-17    TPro  6.9  /  Alb  4.2  /  TBili  0.5  /  DBili  x   /  AST  19  /  ALT  16  /  AlkPhos  51  12-17    PT/INR - ( 17 Dec 2021 07:04 )   PT: 12.3 sec;   INR: 1.08 ratio         PTT - ( 17 Dec 2021 07:04 )  PTT:36.4 sec          RADIOLOGY & ADDITIONAL TESTS:    Imaging Personally Reviewed:    Care Discussed with Consultants/Other Providers:

## 2021-12-17 NOTE — DISCHARGE NOTE NURSING/CASE MANAGEMENT/SOCIAL WORK - NSDCPEFALRISK_GEN_ALL_CORE
For information on Fall & Injury Prevention, visit: [email or web address] OR  [email or web address] OR  https://www.cdc.gov/steadi/patient.html

## 2021-12-17 NOTE — PROGRESS NOTE ADULT - PROBLEM SELECTOR PLAN 1
- GI consult - outpatient coloscopy  - tolerating PO well  - Hgb stable
Avoid A/C  - GI planning for colonoscopy tomorrow  - clear liquid diet for now.  - Monitor Hgb

## 2021-12-20 PROBLEM — C61 MALIGNANT NEOPLASM OF PROSTATE: Chronic | Status: ACTIVE | Noted: 2021-12-15

## 2021-12-20 RX ORDER — PEG-3350, SODIUM SULFATE, SODIUM CHLORIDE, POTASSIUM CHLORIDE, SODIUM ASCORBATE AND ASCORBIC ACID 7.5-2.691G
100 KIT ORAL
Qty: 1 | Refills: 0 | Status: ACTIVE | COMMUNITY
Start: 2021-12-20 | End: 1900-01-01

## 2022-01-04 ENCOUNTER — NON-APPOINTMENT (OUTPATIENT)
Age: 77
End: 2022-01-04

## 2022-01-05 NOTE — ASSESSMENT
[FreeTextEntry1] : Mr. Corley is a 76 year old male with history of prostate cancer 2002 with radiation seed implants- follows with Dr Woods, last PSA 11/2021 normal, here for referral from Dr. Cezar Dubon of lymphocytosis with flow cytometry showing 13% CD5+ b cells. Patient has less than 5K b cells. The right supraclavicular LN may be a cyst and unclear if truly palpable lymphadenopathy other than a small <1cm left post auricular LN. Diagnosis unclear at this time and need to resend flow cytometry and FISH. 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 Will need to further discuss with primary care attending Dr. Dubon regarding the need to excise the cyst vs LN in the right supraclavicular.  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 \par - patient had routine health maintenance exam with elevated lymphocytosis \par - has had a right supraclavicular LN vs cyst for years\par - small <1cm lymph node palpable left post auricular \par - no splenomegaly on exam\par - WBC 8.5 ALC 3417 HGB 14.6  \par - ferritin normal, kappa FLC 16.5, Lambda FLC 12.2, ratio 1.34, beta 2 microglobulin 0.3, CMP normal \par - Peripheral Flow Cytometry 11/2: B cells 13% of total cells: CD19+, CD20+ (bright), CD5+, CD10-, CD23-, FMC7+, kappa restricted  \par - will send flow, FISH, ESR, and COVID antibodies today \par - based on lab results and discussion with Dr. Dubon will consider excisional biopsy of right supraclavicular LN or cyst\par - at this time continued observation \par - RTO in one month to follow up with Dr. Tovar \par \par Patient seen and discussed with Dr. Tovar. Time spent with patient 45min. \par \par Bakari Perez MD, PGY-5 \par Hematology/Oncology Fellow \par

## 2022-01-05 NOTE — PHYSICAL EXAM
[Fully active, able to carry on all pre-disease performance without restriction] : Status 0 - Fully active, able to carry on all pre-disease performance without restriction [Normal] : affect appropriate [de-identified] : 2cm x 2cm right supraclavular LN vs cyst- unclear if actually LN. left post auricular LN <1cm

## 2022-01-05 NOTE — ASU PATIENT PROFILE, ADULT - FALL HARM RISK - UNIVERSAL INTERVENTIONS
Bed in lowest position, wheels locked, appropriate side rails in place/Call bell, personal items and telephone in reach/Instruct patient to call for assistance before getting out of bed or chair/Non-slip footwear when patient is out of bed/[place] to call system/Physically safe environment - no spills, clutter or unnecessary equipment/Purposeful Proactive Rounding/Room/bathroom lighting operational, light cord in reach

## 2022-01-05 NOTE — REVIEW OF SYSTEMS
[Cough] : cough [Negative] : Allergic/Immunologic [FreeTextEntry6] : intermittent  [FreeTextEntry7] : 6-7 formed BM/day for 6-7 years  [de-identified] : right supraclavicular LN

## 2022-01-05 NOTE — HISTORY OF PRESENT ILLNESS
[Disease:__________________________] : Disease: [unfilled] [de-identified] : Mr. Corley is a 76 year old male with history of prostate cancer 2002 with radiation seed implants- follows with Dr Woods, last PSA 11/2021 normal, here for referral from Dr. Cezar Dubon of lymphocytosis with flow cytometry showing 13% CD5+ b cells. Overall in normal state of health. States had a cyst at one point supraclavicular, unsure if right or left. Has had an enlarged right supraclavicular LN vs cyst for at least 5 years that has not changed in size significantly. States gets fatigue after 1 hour of activity but otherwise feels fine. Has been coughing for approximately 1 year that has been the same, not worse. States it is intermittent. Had to get nebulizer treatments a few years ago for bronchitis. Has 6-7 BMs per day after the prostate radiation. Weight has been stable and appetite good. Currently denies headache, vision changes, chest pain, sob, abd pain, n/v/d/c, lower extremity swelling, rash. No fevers or night sweats. Had Pfizer COVID vaccine and booster 2 months ago. Received flu shot annually. Had colonoscopy 3 years ago normal. \par  [de-identified] : Peripheral Flow Cytometry 11/2: B cells 13% of total cells: CD19+, CD20+ (bright), CD5+, CD10-, CD23-, FMC7+, kappa restricted

## 2022-01-05 NOTE — RESULTS/DATA
[FreeTextEntry1] : CBC 11/2/21 showed a WBC of 8.5 HGB 14.6  ALC 3417 ANC 4318 \par Ferritin 207\par CMP normal \par B2 0.3 \par Kappa FLC 16.5\par Lambda FLC 12.2\par K/L ratio 1.34 \par Peripheral Flow Cytometry 11/2: B cells 13% of total cells: CD19+, CD20+ (bright), CD5+, CD10-, CD23-, FMC7+, kappa restricted  \par \par \par

## 2022-01-06 ENCOUNTER — RESULT REVIEW (OUTPATIENT)
Age: 77
End: 2022-01-06

## 2022-01-06 ENCOUNTER — OUTPATIENT (OUTPATIENT)
Dept: OUTPATIENT SERVICES | Facility: HOSPITAL | Age: 77
LOS: 1 days | Discharge: HOME | End: 2022-01-06
Payer: MEDICARE

## 2022-01-06 ENCOUNTER — APPOINTMENT (OUTPATIENT)
Dept: GASTROENTEROLOGY | Facility: HOSPITAL | Age: 77
End: 2022-01-06

## 2022-01-06 VITALS
WEIGHT: 145.06 LBS | RESPIRATION RATE: 14 BRPM | HEART RATE: 67 BPM | OXYGEN SATURATION: 96 % | SYSTOLIC BLOOD PRESSURE: 131 MMHG | TEMPERATURE: 98 F | HEIGHT: 65 IN | DIASTOLIC BLOOD PRESSURE: 69 MMHG

## 2022-01-06 VITALS
SYSTOLIC BLOOD PRESSURE: 125 MMHG | DIASTOLIC BLOOD PRESSURE: 71 MMHG | OXYGEN SATURATION: 98 % | RESPIRATION RATE: 17 BRPM | HEART RATE: 53 BPM

## 2022-01-06 DIAGNOSIS — K92.1 MELENA: ICD-10-CM

## 2022-01-06 PROCEDURE — 88305 TISSUE EXAM BY PATHOLOGIST: CPT | Mod: 26

## 2022-01-06 PROCEDURE — 45385 COLONOSCOPY W/LESION REMOVAL: CPT

## 2022-01-06 PROCEDURE — 45380 COLONOSCOPY AND BIOPSY: CPT | Mod: 59

## 2022-01-10 LAB — SURGICAL PATHOLOGY STUDY: SIGNIFICANT CHANGE UP

## 2022-01-14 ENCOUNTER — NON-APPOINTMENT (OUTPATIENT)
Age: 77
End: 2022-01-14

## 2022-01-15 ENCOUNTER — LABORATORY RESULT (OUTPATIENT)
Age: 77
End: 2022-01-15

## 2022-01-18 LAB
ALBUMIN SERPL ELPH-MCNC: 5.1 G/DL
ALP BLD-CCNC: 60 U/L
ALT SERPL-CCNC: 21 U/L
ANION GAP SERPL CALC-SCNC: 18 MMOL/L
AST SERPL-CCNC: 23 U/L
BASOPHILS # BLD AUTO: 0.08 K/UL
BASOPHILS NFR BLD AUTO: 0.9 %
BILIRUB SERPL-MCNC: 0.6 MG/DL
BUN SERPL-MCNC: 12 MG/DL
CALCIUM SERPL-MCNC: 9.8 MG/DL
CHLORIDE SERPL-SCNC: 103 MMOL/L
CO2 SERPL-SCNC: 22 MMOL/L
CREAT SERPL-MCNC: 0.92 MG/DL
EOSINOPHIL # BLD AUTO: 0.16 K/UL
EOSINOPHIL NFR BLD AUTO: 1.7 %
GLUCOSE SERPL-MCNC: 111 MG/DL
HCT VFR BLD CALC: 49 %
HGB BLD-MCNC: 15.7 G/DL
LDH SERPL-CCNC: 177 U/L
LYMPHOCYTES # BLD AUTO: 2.97 K/UL
LYMPHOCYTES NFR BLD AUTO: 31.9 %
MAGNESIUM SERPL-MCNC: 2.2 MG/DL
MAN DIFF?: NORMAL
MCHC RBC-ENTMCNC: 31.3 PG
MCHC RBC-ENTMCNC: 32 GM/DL
MCV RBC AUTO: 97.8 FL
MONOCYTES # BLD AUTO: 0.89 K/UL
MONOCYTES NFR BLD AUTO: 9.5 %
NEUTROPHILS # BLD AUTO: 4.42 K/UL
NEUTROPHILS NFR BLD AUTO: 47.4 %
PHOSPHATE SERPL-MCNC: 3.4 MG/DL
PLATELET # BLD AUTO: 250 K/UL
POTASSIUM SERPL-SCNC: 4.4 MMOL/L
PROT SERPL-MCNC: 7.7 G/DL
RBC # BLD: 5.01 M/UL
RBC # FLD: 12.4 %
SODIUM SERPL-SCNC: 143 MMOL/L
URATE SERPL-MCNC: 7.2 MG/DL
WBC # FLD AUTO: 9.32 K/UL

## 2022-01-19 ENCOUNTER — APPOINTMENT (OUTPATIENT)
Dept: HEMATOLOGY ONCOLOGY | Facility: CLINIC | Age: 77
End: 2022-01-19
Payer: MEDICARE

## 2022-01-19 DIAGNOSIS — Z86.2 PERSONAL HISTORY OF DISEASES OF THE BLOOD AND BLOOD-FORMING ORGANS AND CERTAIN DISORDERS INVOLVING THE IMMUNE MECHANISM: ICD-10-CM

## 2022-01-19 PROCEDURE — 99213 OFFICE O/P EST LOW 20 MIN: CPT | Mod: 95

## 2022-01-19 NOTE — REASON FOR VISIT
[Home] : at home, [unfilled] , at the time of the visit. [Medical Office: ([facility])___] : at the medical office located in  [Follow-Up Visit] : a follow-up visit for [Leukocytosis] : leukocytosis [Family Member] : family member

## 2022-01-20 NOTE — HISTORY OF PRESENT ILLNESS
[Disease:__________________________] : Disease: [unfilled] [de-identified] : Mr. Corley is a 76 year old male with history of prostate cancer 2002 with radiation seed implants- follows with Dr Woods, last PSA 11/2021 normal, here for referral from Dr. Cezar Dubon of lymphocytosis with flow cytometry showing 13% CD5+ b cells. Overall in normal state of health. States had a cyst at one point supraclavicular, unsure if right or left. Has had an enlarged right supraclavicular LN vs cyst for at least 5 years that has not changed in size significantly. States gets fatigue after 1 hour of activity but otherwise feels fine. Has been coughing for approximately 1 year that has been the same, not worse. States it is intermittent. Had to get nebulizer treatments a few years ago for bronchitis. Has 6-7 BMs per day after the prostate radiation. Weight has been stable and appetite good. Currently denies headache, vision changes, chest pain, sob, abd pain, n/v/d/c, lower extremity swelling, rash. No fevers or night sweats. Had Pfizer COVID vaccine and booster 2 months ago. Received flu shot annually. Had colonoscopy 3 years ago normal. \par  [de-identified] : Peripheral Flow Cytometry 11/2: B cells 13% of total cells: CD19+, CD20+ (bright), CD5+, CD10-, CD23-, FMC7+, kappa restricted   [de-identified] : No acute complaints since last visit. Denies fevers, chills, weight loss, night sweats, fatigue. Lump on R shoulder is unchanged, not painful. \par

## 2022-01-20 NOTE — PHYSICAL EXAM
[Fully active, able to carry on all pre-disease performance without restriction] : Status 0 - Fully active, able to carry on all pre-disease performance without restriction [Normal] : clear to auscultation bilaterally, no dullness, no wheezing [de-identified] : symmetric chest rise

## 2022-03-02 ENCOUNTER — APPOINTMENT (OUTPATIENT)
Dept: ORTHOPEDIC SURGERY | Facility: CLINIC | Age: 77
End: 2022-03-02
Payer: MEDICARE

## 2022-03-02 ENCOUNTER — NON-APPOINTMENT (OUTPATIENT)
Age: 77
End: 2022-03-02

## 2022-03-02 VITALS
WEIGHT: 148 LBS | BODY MASS INDEX: 24.66 KG/M2 | DIASTOLIC BLOOD PRESSURE: 79 MMHG | SYSTOLIC BLOOD PRESSURE: 150 MMHG | HEART RATE: 64 BPM | HEIGHT: 65 IN

## 2022-03-02 DIAGNOSIS — M54.9 DORSALGIA, UNSPECIFIED: ICD-10-CM

## 2022-03-02 DIAGNOSIS — M51.37 OTHER INTERVERTEBRAL DISC DEGENERATION, LUMBOSACRAL REGION: ICD-10-CM

## 2022-03-02 PROCEDURE — 99204 OFFICE O/P NEW MOD 45 MIN: CPT

## 2022-03-02 PROCEDURE — 72170 X-RAY EXAM OF PELVIS: CPT | Mod: 59

## 2022-03-02 PROCEDURE — 72110 X-RAY EXAM L-2 SPINE 4/>VWS: CPT

## 2022-03-02 RX ORDER — GLUCOSAMINE HCL/CHONDROITIN SU 500-400 MG
3 CAPSULE ORAL
Refills: 0 | Status: ACTIVE | COMMUNITY

## 2022-03-02 RX ORDER — MULTIVITAMIN
TABLET ORAL
Refills: 0 | Status: ACTIVE | COMMUNITY

## 2022-04-11 NOTE — DISCUSSION/SUMMARY
[Medication Risks Reviewed] : Medication risks reviewed [de-identified] : Patient presents with low back painWithout radicular pain for approximately 1 month.  Overall he is 90% better.  X-rays demonstrate degenerative change in the thoracolumbar junction which could be contributing to his acute flareup of back pain.  Given his symptomatic improvement at this time no clear interventions are indicated.  However physical therapy would be helpful to help him with his symptoms and long-term management of his condition.  He is not interested in prescription medications.  I will continue see him back on as-needed basis.

## 2022-04-11 NOTE — CONSULT LETTER
[Dear  ___] : Dear  [unfilled], [Consult Letter:] : I had the pleasure of evaluating your patient, [unfilled]. [FreeTextEntry2] : Cezar Dubon [FreeTextEntry1] : Thank you for this referral. I have enclosed my note for your review. Please feel free to contact my office if you have additional questions regarding this patient.\par \par Regards,\par Jaen Bullock MD, FACS, FAAOS\par \par  of Orthopaedic Surgery\par [facility] [employer]\par Spinal Reconstruction Surgery\par Minimally Invasive Spinal Surgery\par [facility]

## 2022-04-11 NOTE — PHYSICAL EXAM
[Normal] : Gait: normal [LE] : Sensory: Intact in bilateral lower extremities [0] : left ankle jerk 0 [DP] : dorsalis pedis 2+ and symmetric bilaterally [SLR] : negative straight leg raise [Plantar Reflex Right Only] : absent on the right [Plantar Reflex Left Only] : absent on the left [DTR Reflexes Clonus Of Right Ankle (___ Beats)] : absent on the right [DTR Reflexes Clonus Of Left Ankle (___ Beats)] : absent on the left [de-identified] : seen with his daughter\par The pt is awake, alert and oriented to self, place and time, is comfortable and in no acute distress. Inspection of neck, back and lower extremities bilaterally reveals no rashes or ecchymotic lesions.  There is no obvious abnormal spinal curvature in the sagittal and coronal planes. There is no tenderness over the cervical, thoracic or lumbar spine, or the paraspinal or upper and lower extremities musculature. There is no sacroiliac tenderness. No greater trochanteric tenderness bilaterally. No atrophy or abnormal movements noted in the upper or lower extremities. There is no swelling noted in the upper or lower extremities bilaterally. No cervical lymphadenopathy noted anteriorly. No joint laxity noted in the upper and lower extremity joints bilaterally.\par Hip range of motion is degrees internal rotation 30° external rotation without pain. Full range of motion of the shoulders bilaterally with no significant pain\par There is no groin pain with hip internal rotation and a negative DEBBIE test bilaterally.  [de-identified] : flex to mid tibia, ext 30 degrees no pain\par enlarged right supraclavicular lymph node [de-identified] : 4 views lumbar spine demonstrate no significant scoliosis.  Normal lumbar lordosis with degenerative change of the thoracolumbar junction.  Anterior osteophytes at multiple levels.  No obvious acute fractures.  No dynamic instability between flexion-extension.\par \par AP pelvis demonstrates normal appearance of the hips bilaterally.  Radiation seed implant suspected level of the prostate.  No acute fractures.

## 2022-04-11 NOTE — HISTORY OF PRESENT ILLNESS
[2] : a current pain level of 2/10 [Daily] : ~He/She~ states the symptoms seem to be occuring daily [Bending] : worsened by bending [Walking] : worsened by walking [Rest] : relieved by rest [Improving] : improving [___ wks] : [unfilled] week(s) ago [de-identified] : Patient is here today for evaluation on his low back no radicular pain going on for approximately one month patient not sure but thinks after lifting something heavy - pail of water up and down stairs multiple times.. Patient saw his PCP given prednisone for 10 days helped a little and sent for xrays but not available today and referred for spine evaluation.\par Routinely cleans his home\par Overall 90% better\par Stopped ibuprofen and oxycodone after 10 days [de-identified] : prednisone

## 2022-08-31 ENCOUNTER — APPOINTMENT (OUTPATIENT)
Dept: HEMATOLOGY ONCOLOGY | Facility: CLINIC | Age: 77
End: 2022-08-31

## 2022-08-31 ENCOUNTER — LABORATORY RESULT (OUTPATIENT)
Age: 77
End: 2022-08-31

## 2022-08-31 DIAGNOSIS — D72.820 LYMPHOCYTOSIS (SYMPTOMATIC): ICD-10-CM

## 2022-08-31 DIAGNOSIS — C91.10 LYMPHOCYTOSIS (SYMPTOMATIC): ICD-10-CM

## 2022-08-31 PROCEDURE — 99213 OFFICE O/P EST LOW 20 MIN: CPT

## 2022-08-31 PROCEDURE — 85025 COMPLETE CBC W/AUTO DIFF WBC: CPT

## 2022-08-31 NOTE — REASON FOR VISIT
[Follow-Up Visit] : a follow-up visit for [Leukocytosis] : leukocytosis [Family Member] : family member

## 2022-08-31 NOTE — ASSESSMENT
[FreeTextEntry1] : 76 year old male with history of prostate cancer 2002 with radiation seed implants- follows with Dr Woods, last PSA 11/2021 normal, here for referral from Dr. Cezar Dubon of lymphocytosis with flow cytometry showing 13% CD5+ b cells. \par \par Rule out CLL \par - previous flow 11/02/21 showed B cells 13% of total cells: CD19+, CD20+ (bright), CD5+, CD10-, CD23-, FMC7+, kappa restricted \par - physical exam with right supraclavicular LN vs cyst for years, small <1cm lymph node palpable left post auricular, no splenomegaly \par - repeat bloodwork 01/15 shows normal CBC, ALC 2.97\par - peripheral flow 01/15 with 14% monotypic B cells positive for kappa, CD19, CD20, CD23, CD5, FMC-7, dim CD13; negative CD10) consistent with CD5 positive B-cell lymphoproliferative disorder. FISH CLL panel with trisomy 12 and del 13q\par - at this time would prefer diagnosis of early stage CLL (given possible R supraclavicular LN) although ddx can also include CLL given low abs lymphocyte count \par - recommend active surveillance at this time. Can consider ultrasound bilateral neck/shoulder to clarify whether there is lymphadenopathy vs cyst\par - RTO in 6-12 months, sooner PRN\par

## 2022-08-31 NOTE — PHYSICAL EXAM
[Fully active, able to carry on all pre-disease performance without restriction] : Status 0 - Fully active, able to carry on all pre-disease performance without restriction [Normal] : affect appropriate [de-identified] : 2 cm x 2 cm right supraclavicular LN vs cyst-unclear if actually LN, left post auricular LN ,1cm

## 2022-08-31 NOTE — HISTORY OF PRESENT ILLNESS
[Disease:__________________________] : Disease: [unfilled] [de-identified] : Mr. Corley is a 76 year old male with history of prostate cancer 2002 with radiation seed implants- follows with Dr Woods, last PSA 11/2021 normal, here for referral from Dr. Cezar Duobn of lymphocytosis with flow cytometry showing 13% CD5+ b cells. Overall in normal state of health. States had a cyst at one point supraclavicular, unsure if right or left. Has had an enlarged right supraclavicular LN vs cyst for at least 5 years that has not changed in size significantly. States gets fatigue after 1 hour of activity but otherwise feels fine. Has been coughing for approximately 1 year that has been the same, not worse. States it is intermittent. Had to get nebulizer treatments a few years ago for bronchitis. Has 6-7 BMs per day after the prostate radiation. Weight has been stable and appetite good. Currently denies headache, vision changes, chest pain, sob, abd pain, n/v/d/c, lower extremity swelling, rash. No fevers or night sweats. Had Pfizer COVID vaccine and booster 2 months ago. Received flu shot annually. Had colonoscopy 3 years ago normal. \par  [de-identified] : Peripheral Flow Cytometry 11/2: B cells 13% of total cells: CD19+, CD20+ (bright), CD5+, CD10-, CD23-, FMC7+, kappa restricted   [de-identified] : No acute complaints since last visit. Feel very well overall. Denies fevers, chills, weight loss, night sweats, fatigue. Lump on R shoulder is unchanged, not painful. \par

## 2022-09-01 LAB
ALBUMIN SERPL ELPH-MCNC: 4.8 G/DL
ALP BLD-CCNC: 65 U/L
ALT SERPL-CCNC: 22 U/L
ANION GAP SERPL CALC-SCNC: 13 MMOL/L
AST SERPL-CCNC: 24 U/L
BILIRUB SERPL-MCNC: 0.5 MG/DL
BUN SERPL-MCNC: 13 MG/DL
CALCIUM SERPL-MCNC: 9.9 MG/DL
CHLORIDE SERPL-SCNC: 101 MMOL/L
CO2 SERPL-SCNC: 25 MMOL/L
CREAT SERPL-MCNC: 0.92 MG/DL
EGFR: 86 ML/MIN/1.73M2
GLUCOSE SERPL-MCNC: 93 MG/DL
LDH SERPL-CCNC: 179 U/L
MAGNESIUM SERPL-MCNC: 2.2 MG/DL
PHOSPHATE SERPL-MCNC: 3.7 MG/DL
POTASSIUM SERPL-SCNC: 4.5 MMOL/L
PROT SERPL-MCNC: 7.4 G/DL
SODIUM SERPL-SCNC: 139 MMOL/L
URATE SERPL-MCNC: 6.6 MG/DL

## 2023-01-26 NOTE — ASU PATIENT PROFILE, ADULT - ACCEPTABLE
TRANSFER - IN REPORT:    Verbal report received from [address] [place] [address] Avenue on [address]  being received from ED  for routine progression of patient care      Report consisted of patient's Situation, Background, Assessment and   Recommendations(SBAR). Information from the following report(s) Nurse Handoff Report was reviewed with the receiving nurse. Opportunity for questions and clarification was provided. Assessment completed upon patient's arrival to unit and care assumed. 5

## 2023-06-21 ENCOUNTER — OUTPATIENT (OUTPATIENT)
Dept: OUTPATIENT SERVICES | Facility: HOSPITAL | Age: 78
LOS: 1 days | Discharge: HOME | End: 2023-06-21

## 2023-06-21 DIAGNOSIS — Z15.89 GENETIC SUSCEPTIBILITY TO OTHER DISEASE: ICD-10-CM

## 2023-06-28 ENCOUNTER — RESULT REVIEW (OUTPATIENT)
Age: 78
End: 2023-06-28

## 2023-06-28 ENCOUNTER — APPOINTMENT (OUTPATIENT)
Dept: HEMATOLOGY ONCOLOGY | Facility: CLINIC | Age: 78
End: 2023-06-28

## 2023-06-28 ENCOUNTER — APPOINTMENT (OUTPATIENT)
Dept: HEMATOLOGY ONCOLOGY | Facility: CLINIC | Age: 78
End: 2023-06-28
Payer: MEDICARE

## 2023-06-28 VITALS
WEIGHT: 144.62 LBS | DIASTOLIC BLOOD PRESSURE: 76 MMHG | OXYGEN SATURATION: 97 % | HEART RATE: 59 BPM | HEIGHT: 65.51 IN | SYSTOLIC BLOOD PRESSURE: 128 MMHG | RESPIRATION RATE: 16 BRPM | BODY MASS INDEX: 23.81 KG/M2 | TEMPERATURE: 96.4 F

## 2023-06-28 LAB
BASOPHILS # BLD AUTO: 0 K/UL — SIGNIFICANT CHANGE UP (ref 0–0.2)
BASOPHILS NFR BLD AUTO: 0 % — SIGNIFICANT CHANGE UP (ref 0–2)
EOSINOPHIL # BLD AUTO: 0.41 K/UL — SIGNIFICANT CHANGE UP (ref 0–0.5)
EOSINOPHIL NFR BLD AUTO: 4 % — SIGNIFICANT CHANGE UP (ref 0–6)
HCT VFR BLD CALC: 42.9 % — SIGNIFICANT CHANGE UP (ref 39–50)
HGB BLD-MCNC: 14.6 G/DL — SIGNIFICANT CHANGE UP (ref 13–17)
LYMPHOCYTES # BLD AUTO: 4.75 K/UL — HIGH (ref 1–3.3)
LYMPHOCYTES # BLD AUTO: 46 % — HIGH (ref 13–44)
MCHC RBC-ENTMCNC: 31.5 PG — SIGNIFICANT CHANGE UP (ref 27–34)
MCHC RBC-ENTMCNC: 34 G/DL — SIGNIFICANT CHANGE UP (ref 32–36)
MCV RBC AUTO: 92.5 FL — SIGNIFICANT CHANGE UP (ref 80–100)
MONOCYTES # BLD AUTO: 0.1 K/UL — SIGNIFICANT CHANGE UP (ref 0–0.9)
MONOCYTES NFR BLD AUTO: 1 % — LOW (ref 2–14)
NEUTROPHILS # BLD AUTO: 4.75 K/UL — SIGNIFICANT CHANGE UP (ref 1.8–7.4)
NEUTROPHILS NFR BLD AUTO: 46 % — SIGNIFICANT CHANGE UP (ref 43–77)
NRBC # BLD: 0 /100 — SIGNIFICANT CHANGE UP (ref 0–0)
NRBC # BLD: SIGNIFICANT CHANGE UP /100 WBCS (ref 0–0)
PLAT MORPH BLD: NORMAL — SIGNIFICANT CHANGE UP
PLATELET # BLD AUTO: 202 K/UL — SIGNIFICANT CHANGE UP (ref 150–400)
RBC # BLD: 4.64 M/UL — SIGNIFICANT CHANGE UP (ref 4.2–5.8)
RBC # FLD: 12 % — SIGNIFICANT CHANGE UP (ref 10.3–14.5)
RBC BLD AUTO: SIGNIFICANT CHANGE UP
VARIANT LYMPHS # BLD: 3 % — SIGNIFICANT CHANGE UP (ref 0–6)
WBC # BLD: 10.32 K/UL — SIGNIFICANT CHANGE UP (ref 3.8–10.5)
WBC # FLD AUTO: 10.32 K/UL — SIGNIFICANT CHANGE UP (ref 3.8–10.5)

## 2023-06-28 PROCEDURE — 99214 OFFICE O/P EST MOD 30 MIN: CPT

## 2023-06-30 LAB
ALBUMIN SERPL ELPH-MCNC: 4.9 G/DL
ALP BLD-CCNC: 64 U/L
ALT SERPL-CCNC: 26 U/L
ANION GAP SERPL CALC-SCNC: 10 MMOL/L
AST SERPL-CCNC: 31 U/L
BILIRUB SERPL-MCNC: 0.4 MG/DL
BUN SERPL-MCNC: 13 MG/DL
CALCIUM SERPL-MCNC: 10.1 MG/DL
CHLORIDE SERPL-SCNC: 101 MMOL/L
CO2 SERPL-SCNC: 26 MMOL/L
CREAT SERPL-MCNC: 0.81 MG/DL
EGFR: 91 ML/MIN/1.73M2
GLUCOSE SERPL-MCNC: 108 MG/DL
LDH SERPL-CCNC: 177 U/L
MAGNESIUM SERPL-MCNC: 2.2 MG/DL
PHOSPHATE SERPL-MCNC: 4.1 MG/DL
POTASSIUM SERPL-SCNC: 4.6 MMOL/L
PROT SERPL-MCNC: 7.5 G/DL
SODIUM SERPL-SCNC: 137 MMOL/L
URATE SERPL-MCNC: 6.4 MG/DL

## 2023-06-30 NOTE — HISTORY OF PRESENT ILLNESS
[Disease:__________________________] : Disease: [unfilled] [de-identified] : Mr. Corley is a 76 year old male with history of prostate cancer 2002 with radiation seed implants- follows with Dr Woods, last PSA 11/2021 normal, here for referral from Dr. Cezar Dubon of lymphocytosis with flow cytometry showing 13% CD5+ b cells. Overall in normal state of health. States had a cyst at one point supraclavicular, unsure if right or left. Has had an enlarged right supraclavicular LN vs cyst for at least 5 years that has not changed in size significantly. States gets fatigue after 1 hour of activity but otherwise feels fine. Has been coughing for approximately 1 year that has been the same, not worse. States it is intermittent. Had to get nebulizer treatments a few years ago for bronchitis. Has 6-7 BMs per day after the prostate radiation. Weight has been stable and appetite good. Currently denies headache, vision changes, chest pain, sob, abd pain, n/v/d/c, lower extremity swelling, rash. No fevers or night sweats. Had Pfizer COVID vaccine and booster 2 months ago. Received flu shot annually. Had colonoscopy 3 years ago normal. \par  [de-identified] : Peripheral Flow Cytometry 11/2: B cells 13% of total cells: CD19+, CD20+ (bright), CD5+, CD10-, CD23-, FMC7+, kappa restricted   [de-identified] : 6/28/23: Pt returns for follow up. He is feeling well. No new complaints. Denies chronic infections/ night sweats/ weight loss/fevers. Patient consented for MBLL study but never received culture cup for stool collection. No new medications.

## 2023-06-30 NOTE — RESULTS/DATA
[FreeTextEntry1] : CBC 8/31/22:\par WBC 9.4\par ALC 4.08\par ANC 4.54\par HGB 15.3\par HCT 45.1\par .0\par \par \par \par \par CBC 11/2/21 showed a WBC of 8.5 HGB 14.6  ALC 3417 ANC 4318 \par Ferritin 207\par CMP normal \par B2 0.3 \par Kappa FLC 16.5\par Lambda FLC 12.2\par K/L ratio 1.34 \par Peripheral Flow Cytometry 11/2: B cells 13% of total cells: CD19+, CD20+ (bright), CD5+, CD10-, CD23-, FMC7+, kappa restricted  \par \par \par

## 2023-06-30 NOTE — ASSESSMENT
[FreeTextEntry1] : 76 year old male with history of prostate cancer 2002 with radiation seed implants- follows with Dr Woods, last PSA 11/2021 normal, here for referral from Dr. Cezar Dubon of lymphocytosis with flow cytometry showing 13% CD5+ b cells. \par \par Rule out CLL \par - previous flow 11/02/21 showed B cells 13% of total cells: CD19+, CD20+ (bright), CD5+, CD10-, CD23-, FMC7+, kappa restricted \par - physical exam with right supraclavicular LN vs cyst for years, small <1cm lymph node palpable left post auricular, no splenomegaly \par -bloodwork  shows normal CBC with lymphocytosis\par - peripheral flow 01/15 with 14% monotypic B cells positive for kappa, CD19, CD20, CD23, CD5, FMC-7, dim CD13; negative CD10) consistent with CD5 positive B-cell lymphoproliferative disorder. FISH CLL panel with trisomy 12 and del 13q\par - recommend active surveillance at this time. WIll request research team reach out to patient to assist with participation in study and specimen collection. \par - RTO in 6-12 months, sooner PRN\par

## 2024-04-17 ENCOUNTER — OUTPATIENT (OUTPATIENT)
Dept: OUTPATIENT SERVICES | Facility: HOSPITAL | Age: 79
LOS: 1 days | Discharge: HOME | End: 2024-04-17

## 2024-04-17 DIAGNOSIS — D72.820 LYMPHOCYTOSIS (SYMPTOMATIC): ICD-10-CM

## 2024-04-24 ENCOUNTER — RESULT REVIEW (OUTPATIENT)
Age: 79
End: 2024-04-24

## 2024-04-24 ENCOUNTER — APPOINTMENT (OUTPATIENT)
Dept: HEMATOLOGY ONCOLOGY | Facility: CLINIC | Age: 79
End: 2024-04-24
Payer: MEDICARE

## 2024-04-24 ENCOUNTER — APPOINTMENT (OUTPATIENT)
Dept: HEMATOLOGY ONCOLOGY | Facility: CLINIC | Age: 79
End: 2024-04-24

## 2024-04-24 VITALS
HEART RATE: 61 BPM | BODY MASS INDEX: 23.94 KG/M2 | DIASTOLIC BLOOD PRESSURE: 80 MMHG | OXYGEN SATURATION: 96 % | RESPIRATION RATE: 16 BRPM | WEIGHT: 146.14 LBS | SYSTOLIC BLOOD PRESSURE: 147 MMHG | TEMPERATURE: 97.9 F

## 2024-04-24 LAB
ALBUMIN SERPL ELPH-MCNC: 4.8 G/DL
ALP BLD-CCNC: 68 U/L
ALT SERPL-CCNC: 20 U/L
ANION GAP SERPL CALC-SCNC: 13 MMOL/L
AST SERPL-CCNC: 27 U/L
BASOPHILS # BLD AUTO: 0 K/UL — SIGNIFICANT CHANGE UP (ref 0–0.2)
BASOPHILS NFR BLD AUTO: 0 % — SIGNIFICANT CHANGE UP (ref 0–2)
BILIRUB SERPL-MCNC: 0.3 MG/DL
BUN SERPL-MCNC: 11 MG/DL
CALCIUM SERPL-MCNC: 9.5 MG/DL
CHLORIDE SERPL-SCNC: 101 MMOL/L
CO2 SERPL-SCNC: 22 MMOL/L
CREAT SERPL-MCNC: 0.78 MG/DL
EGFR: 91 ML/MIN/1.73M2
EOSINOPHIL # BLD AUTO: 0.21 K/UL — SIGNIFICANT CHANGE UP (ref 0–0.5)
EOSINOPHIL NFR BLD AUTO: 2 % — SIGNIFICANT CHANGE UP (ref 0–6)
GLUCOSE SERPL-MCNC: 102 MG/DL
HCT VFR BLD CALC: 43.9 % — SIGNIFICANT CHANGE UP (ref 39–50)
HGB BLD-MCNC: 14.8 G/DL — SIGNIFICANT CHANGE UP (ref 13–17)
LDH SERPL-CCNC: 210 U/L
LYMPHOCYTES # BLD AUTO: 5.62 K/UL — HIGH (ref 1–3.3)
LYMPHOCYTES # BLD AUTO: 54 % — HIGH (ref 13–44)
MCHC RBC-ENTMCNC: 31.6 PG — SIGNIFICANT CHANGE UP (ref 27–34)
MCHC RBC-ENTMCNC: 33.7 G/DL — SIGNIFICANT CHANGE UP (ref 32–36)
MCV RBC AUTO: 93.8 FL — SIGNIFICANT CHANGE UP (ref 80–100)
MONOCYTES # BLD AUTO: 0.83 K/UL — SIGNIFICANT CHANGE UP (ref 0–0.9)
MONOCYTES NFR BLD AUTO: 8 % — SIGNIFICANT CHANGE UP (ref 2–14)
MYELOCYTES NFR BLD: 1 % — HIGH (ref 0–0)
NEUTROPHILS # BLD AUTO: 3.64 K/UL — SIGNIFICANT CHANGE UP (ref 1.8–7.4)
NEUTROPHILS NFR BLD AUTO: 35 % — LOW (ref 43–77)
NRBC # BLD: 0 /100 WBCS — SIGNIFICANT CHANGE UP (ref 0–0)
NRBC # BLD: SIGNIFICANT CHANGE UP /100 WBCS (ref 0–0)
PLAT MORPH BLD: NORMAL — SIGNIFICANT CHANGE UP
PLATELET # BLD AUTO: 222 K/UL — SIGNIFICANT CHANGE UP (ref 150–400)
POTASSIUM SERPL-SCNC: 5.1 MMOL/L
PROT SERPL-MCNC: 7.3 G/DL
RBC # BLD: 4.68 M/UL — SIGNIFICANT CHANGE UP (ref 4.2–5.8)
RBC # FLD: 12 % — SIGNIFICANT CHANGE UP (ref 10.3–14.5)
RBC BLD AUTO: SIGNIFICANT CHANGE UP
SODIUM SERPL-SCNC: 137 MMOL/L
URATE SERPL-MCNC: 6.2 MG/DL
WBC # BLD: 10.41 K/UL — SIGNIFICANT CHANGE UP (ref 3.8–10.5)
WBC # FLD AUTO: 10.41 K/UL — SIGNIFICANT CHANGE UP (ref 3.8–10.5)

## 2024-04-24 PROCEDURE — 99214 OFFICE O/P EST MOD 30 MIN: CPT

## 2024-04-24 NOTE — HISTORY OF PRESENT ILLNESS
[Disease:__________________________] : Disease: [unfilled] [de-identified] : Mr. Corley is a 76 year old male with history of prostate cancer 2002 with radiation seed implants- follows with Dr Woods, last PSA 11/2021 normal, here for referral from Dr. Cezar Dubon of lymphocytosis with flow cytometry showing 13% CD5+ b cells. Overall in normal state of health. States had a cyst at one point supraclavicular, unsure if right or left. Has had an enlarged right supraclavicular LN vs cyst for at least 5 years that has not changed in size significantly. States gets fatigue after 1 hour of activity but otherwise feels fine. Has been coughing for approximately 1 year that has been the same, not worse. States it is intermittent. Had to get nebulizer treatments a few years ago for bronchitis. Has 6-7 BMs per day after the prostate radiation. Weight has been stable and appetite good. Currently denies headache, vision changes, chest pain, sob, abd pain, n/v/d/c, lower extremity swelling, rash. No fevers or night sweats. Had Pfizer COVID vaccine and booster 2 months ago. Received flu shot annually. Had colonoscopy 3 years ago normal. \par   [de-identified] : Peripheral Flow Cytometry 11/2: B cells 13% of total cells: CD19+, CD20+ (bright), CD5+, CD10-, CD23-, FMC7+, kappa restricted   [de-identified] : 6/28/23: Pt returns for follow up. He is feeling well. No new complaints. Denies chronic infections/ night sweats/ weight loss/fevers. Patient consented for MBLL study but never received culture cup for stool collection. No new medications.   4/24/24: Pt returns for follow up. He is feeling well but is easily fatigued but has been living a very sedentary lifestyle. Pt reports that he hasn't been exercising in some time. .Denies weight loss, chronic infections and night sweats. States he has noticed a "lump" inside his rectum. Has had recent colonoscopy (2 years) and was normal. No report of rectal bleedin. No known hemorrhoids.

## 2024-04-24 NOTE — ASSESSMENT
[FreeTextEntry1] : 76 year old male with history of prostate cancer 2002 with radiation seed implants- follows with Dr Woods, last PSA 11/2021 normal, with history of lymphocytosis   #Lymphocytosis - previous flow 11/09/23 showed B cells 27% of total cells (Previously 13%): CD19+, CD20+ (bright), CD5+, CD10-, CD23-, FMC7+, kappa restricted  - physical exam with right supraclavicular LN vs cyst for years, small <1cm lymph node palpable left post auricular, no splenomegaly  -CBC today reviewed WBC 10.4 HGB 14.8 . ALC 5.62 -CMP LDH Uric acid also sent today. Will f/u results - recommend active surveillance at this time. Research team provided collection materials to assist with participation in study and specimen collection.   #Rectal mass (per patient report) _recommend f/u with Dr. Alecia MUSTAFA. - RTO in 6-12 months, sooner PRN

## 2024-04-24 NOTE — PHYSICAL EXAM
[Fully active, able to carry on all pre-disease performance without restriction] : Status 0 - Fully active, able to carry on all pre-disease performance without restriction [Normal] : affect appropriate [de-identified] : 2 cm x 2 cm right supraclavicular LN vs cyst-unclear if actually LN, left post auricular LN ,1cm

## 2024-06-14 ENCOUNTER — APPOINTMENT (OUTPATIENT)
Dept: HEMATOLOGY ONCOLOGY | Facility: CLINIC | Age: 79
End: 2024-06-14

## 2024-07-18 ENCOUNTER — NON-APPOINTMENT (OUTPATIENT)
Age: 79
End: 2024-07-18

## 2024-07-19 ENCOUNTER — APPOINTMENT (OUTPATIENT)
Dept: GASTROENTEROLOGY | Facility: CLINIC | Age: 79
End: 2024-07-19
Payer: MEDICARE

## 2024-07-19 VITALS
HEIGHT: 65 IN | SYSTOLIC BLOOD PRESSURE: 123 MMHG | OXYGEN SATURATION: 95 % | HEART RATE: 61 BPM | BODY MASS INDEX: 24.49 KG/M2 | DIASTOLIC BLOOD PRESSURE: 70 MMHG | WEIGHT: 147 LBS

## 2024-07-19 DIAGNOSIS — K62.89 OTHER SPECIFIED DISEASES OF ANUS AND RECTUM: ICD-10-CM

## 2024-07-19 DIAGNOSIS — K62.7 RADIATION PROCTITIS: ICD-10-CM

## 2024-07-19 PROCEDURE — 99213 OFFICE O/P EST LOW 20 MIN: CPT

## 2024-08-01 ENCOUNTER — APPOINTMENT (OUTPATIENT)
Dept: CARDIOLOGY | Facility: CLINIC | Age: 79
End: 2024-08-01

## 2024-08-01 DIAGNOSIS — Z00.00 ENCOUNTER FOR GENERAL ADULT MEDICAL EXAMINATION W/OUT ABNORMAL FINDINGS: ICD-10-CM

## 2024-11-22 ENCOUNTER — OUTPATIENT (OUTPATIENT)
Dept: OUTPATIENT SERVICES | Facility: HOSPITAL | Age: 79
LOS: 1 days | Discharge: HOME | End: 2024-11-22

## 2024-11-22 DIAGNOSIS — D72.820 LYMPHOCYTOSIS (SYMPTOMATIC): ICD-10-CM

## 2024-12-11 ENCOUNTER — RESULT REVIEW (OUTPATIENT)
Age: 79
End: 2024-12-11

## 2024-12-11 ENCOUNTER — APPOINTMENT (OUTPATIENT)
Dept: HEMATOLOGY ONCOLOGY | Facility: CLINIC | Age: 79
End: 2024-12-11

## 2024-12-11 ENCOUNTER — APPOINTMENT (OUTPATIENT)
Dept: HEMATOLOGY ONCOLOGY | Facility: CLINIC | Age: 79
End: 2024-12-11
Payer: MEDICARE

## 2024-12-11 VITALS
BODY MASS INDEX: 24.02 KG/M2 | HEART RATE: 65 BPM | TEMPERATURE: 97.4 F | HEIGHT: 65 IN | RESPIRATION RATE: 17 BRPM | DIASTOLIC BLOOD PRESSURE: 77 MMHG | WEIGHT: 144.16 LBS | OXYGEN SATURATION: 95 % | SYSTOLIC BLOOD PRESSURE: 128 MMHG

## 2024-12-11 LAB
BASOPHILS # BLD AUTO: 0.05 K/UL — SIGNIFICANT CHANGE UP (ref 0–0.2)
BASOPHILS NFR BLD AUTO: 0.5 % — SIGNIFICANT CHANGE UP (ref 0–2)
EOSINOPHIL # BLD AUTO: 0.13 K/UL — SIGNIFICANT CHANGE UP (ref 0–0.5)
EOSINOPHIL NFR BLD AUTO: 1.2 % — SIGNIFICANT CHANGE UP (ref 0–6)
HCT VFR BLD CALC: 43.3 % — SIGNIFICANT CHANGE UP (ref 39–50)
HGB BLD-MCNC: 14.9 G/DL — SIGNIFICANT CHANGE UP (ref 13–17)
IMM GRANULOCYTES NFR BLD AUTO: 0.2 % — SIGNIFICANT CHANGE UP (ref 0–0.9)
LYMPHOCYTES # BLD AUTO: 4.76 K/UL — HIGH (ref 1–3.3)
LYMPHOCYTES # BLD AUTO: 43.1 % — SIGNIFICANT CHANGE UP (ref 13–44)
MCHC RBC-ENTMCNC: 31.9 PG — SIGNIFICANT CHANGE UP (ref 27–34)
MCHC RBC-ENTMCNC: 34.4 G/DL — SIGNIFICANT CHANGE UP (ref 32–36)
MCV RBC AUTO: 92.7 FL — SIGNIFICANT CHANGE UP (ref 80–100)
MONOCYTES # BLD AUTO: 0.61 K/UL — SIGNIFICANT CHANGE UP (ref 0–0.9)
MONOCYTES NFR BLD AUTO: 5.5 % — SIGNIFICANT CHANGE UP (ref 2–14)
NEUTROPHILS # BLD AUTO: 5.48 K/UL — SIGNIFICANT CHANGE UP (ref 1.8–7.4)
NEUTROPHILS NFR BLD AUTO: 49.5 % — SIGNIFICANT CHANGE UP (ref 43–77)
NRBC # BLD: 0 /100 WBCS — SIGNIFICANT CHANGE UP (ref 0–0)
NRBC BLD-RTO: 0 /100 WBCS — SIGNIFICANT CHANGE UP (ref 0–0)
PLATELET # BLD AUTO: 209 K/UL — SIGNIFICANT CHANGE UP (ref 150–400)
RBC # BLD: 4.67 M/UL — SIGNIFICANT CHANGE UP (ref 4.2–5.8)
RBC # FLD: 12.1 % — SIGNIFICANT CHANGE UP (ref 10.3–14.5)
WBC # BLD: 11.05 K/UL — HIGH (ref 3.8–10.5)
WBC # FLD AUTO: 11.05 K/UL — HIGH (ref 3.8–10.5)

## 2024-12-11 PROCEDURE — 99213 OFFICE O/P EST LOW 20 MIN: CPT

## 2024-12-20 LAB
ALBUMIN SERPL ELPH-MCNC: 4.7 G/DL
ALP BLD-CCNC: 67 U/L
ALT SERPL-CCNC: 19 U/L
ANION GAP SERPL CALC-SCNC: 14 MMOL/L
AST SERPL-CCNC: 18 U/L
BILIRUB SERPL-MCNC: 0.5 MG/DL
BUN SERPL-MCNC: 11 MG/DL
CALCIUM SERPL-MCNC: 9.7 MG/DL
CHLORIDE SERPL-SCNC: 100 MMOL/L
CO2 SERPL-SCNC: 22 MMOL/L
CREAT SERPL-MCNC: 0.79 MG/DL
EGFR: 90 ML/MIN/1.73M2
GLUCOSE SERPL-MCNC: 122 MG/DL
LDH SERPL-CCNC: 170 U/L
POTASSIUM SERPL-SCNC: 4.7 MMOL/L
PROT SERPL-MCNC: 7.9 G/DL
SODIUM SERPL-SCNC: 136 MMOL/L
URATE SERPL-MCNC: 6.6 MG/DL

## 2025-01-03 ENCOUNTER — APPOINTMENT (OUTPATIENT)
Dept: CARDIOLOGY | Facility: CLINIC | Age: 80
End: 2025-01-03

## 2025-01-03 DIAGNOSIS — Z00.00 ENCOUNTER FOR GENERAL ADULT MEDICAL EXAMINATION W/OUT ABNORMAL FINDINGS: ICD-10-CM

## 2025-01-17 ENCOUNTER — APPOINTMENT (OUTPATIENT)
Dept: CARDIOLOGY | Facility: CLINIC | Age: 80
End: 2025-01-17

## 2025-01-17 DIAGNOSIS — Z86.2 PERSONAL HISTORY OF DISEASES OF THE BLOOD AND BLOOD-FORMING ORGANS AND CERTAIN DISORDERS INVOLVING THE IMMUNE MECHANISM: ICD-10-CM

## 2025-01-17 DIAGNOSIS — Z00.00 ENCOUNTER FOR GENERAL ADULT MEDICAL EXAMINATION W/OUT ABNORMAL FINDINGS: ICD-10-CM

## 2025-01-29 ENCOUNTER — NON-APPOINTMENT (OUTPATIENT)
Age: 80
End: 2025-01-29

## 2025-01-29 ENCOUNTER — APPOINTMENT (OUTPATIENT)
Dept: CARDIOLOGY | Facility: CLINIC | Age: 80
End: 2025-01-29
Payer: MEDICARE

## 2025-01-29 VITALS
SYSTOLIC BLOOD PRESSURE: 125 MMHG | HEART RATE: 80 BPM | OXYGEN SATURATION: 93 % | DIASTOLIC BLOOD PRESSURE: 70 MMHG | BODY MASS INDEX: 24.46 KG/M2 | WEIGHT: 147 LBS

## 2025-01-29 DIAGNOSIS — R07.9 CHEST PAIN, UNSPECIFIED: ICD-10-CM

## 2025-01-29 DIAGNOSIS — Z00.00 ENCOUNTER FOR GENERAL ADULT MEDICAL EXAMINATION W/OUT ABNORMAL FINDINGS: ICD-10-CM

## 2025-01-29 DIAGNOSIS — Z86.2 PERSONAL HISTORY OF DISEASES OF THE BLOOD AND BLOOD-FORMING ORGANS AND CERTAIN DISORDERS INVOLVING THE IMMUNE MECHANISM: ICD-10-CM

## 2025-01-29 PROCEDURE — G2211 COMPLEX E/M VISIT ADD ON: CPT

## 2025-01-29 PROCEDURE — G0537: CPT

## 2025-01-29 PROCEDURE — 93000 ELECTROCARDIOGRAM COMPLETE: CPT

## 2025-01-29 PROCEDURE — 99204 OFFICE O/P NEW MOD 45 MIN: CPT

## 2025-02-05 ENCOUNTER — APPOINTMENT (OUTPATIENT)
Dept: CARDIOLOGY | Facility: CLINIC | Age: 80
End: 2025-02-05
Payer: MEDICARE

## 2025-02-05 PROCEDURE — 93306 TTE W/DOPPLER COMPLETE: CPT

## 2025-02-14 ENCOUNTER — APPOINTMENT (OUTPATIENT)
Dept: CT IMAGING | Facility: IMAGING CENTER | Age: 80
End: 2025-02-14

## 2025-03-03 ENCOUNTER — APPOINTMENT (OUTPATIENT)
Dept: CARDIOLOGY | Facility: CLINIC | Age: 80
End: 2025-03-03

## 2025-03-12 ENCOUNTER — OUTPATIENT (OUTPATIENT)
Dept: OUTPATIENT SERVICES | Facility: HOSPITAL | Age: 80
LOS: 1 days | End: 2025-03-12
Payer: MEDICARE

## 2025-03-12 ENCOUNTER — APPOINTMENT (OUTPATIENT)
Dept: CT IMAGING | Facility: IMAGING CENTER | Age: 80
End: 2025-03-12
Payer: MEDICARE

## 2025-03-12 DIAGNOSIS — R07.9 CHEST PAIN, UNSPECIFIED: ICD-10-CM

## 2025-03-12 PROCEDURE — 75574 CT ANGIO HRT W/3D IMAGE: CPT

## 2025-03-12 PROCEDURE — 75574 CT ANGIO HRT W/3D IMAGE: CPT | Mod: 26

## 2025-03-20 ENCOUNTER — OUTPATIENT (OUTPATIENT)
Dept: OUTPATIENT SERVICES | Facility: HOSPITAL | Age: 80
LOS: 1 days | End: 2025-03-20
Payer: MEDICARE

## 2025-03-20 ENCOUNTER — RESULT REVIEW (OUTPATIENT)
Age: 80
End: 2025-03-20

## 2025-03-20 DIAGNOSIS — Z00.8 ENCOUNTER FOR OTHER GENERAL EXAMINATION: ICD-10-CM

## 2025-03-20 PROCEDURE — 75580 N-INVAS EST C FFR SW ALY CTA: CPT | Mod: 26

## 2025-03-20 PROCEDURE — 75580 N-INVAS EST C FFR SW ALY CTA: CPT

## 2025-06-15 ENCOUNTER — OUTPATIENT (OUTPATIENT)
Dept: OUTPATIENT SERVICES | Facility: HOSPITAL | Age: 80
LOS: 1 days | Discharge: HOME | End: 2025-06-15

## 2025-06-15 DIAGNOSIS — D72.820 LYMPHOCYTOSIS (SYMPTOMATIC): ICD-10-CM

## 2025-06-17 ENCOUNTER — NON-APPOINTMENT (OUTPATIENT)
Age: 80
End: 2025-06-17

## 2025-06-18 ENCOUNTER — RESULT REVIEW (OUTPATIENT)
Age: 80
End: 2025-06-18

## 2025-06-18 ENCOUNTER — APPOINTMENT (OUTPATIENT)
Dept: HEMATOLOGY ONCOLOGY | Facility: CLINIC | Age: 80
End: 2025-06-18
Payer: MEDICARE

## 2025-06-18 VITALS
TEMPERATURE: 98.1 F | BODY MASS INDEX: 24.51 KG/M2 | SYSTOLIC BLOOD PRESSURE: 135 MMHG | RESPIRATION RATE: 16 BRPM | OXYGEN SATURATION: 96 % | HEART RATE: 58 BPM | DIASTOLIC BLOOD PRESSURE: 74 MMHG | WEIGHT: 147.27 LBS

## 2025-06-18 LAB
ALBUMIN SERPL ELPH-MCNC: 4.5 G/DL
ALP BLD-CCNC: 66 U/L
ALT SERPL-CCNC: 24 U/L
ANION GAP SERPL CALC-SCNC: 12 MMOL/L
AST SERPL-CCNC: 28 U/L
BASOPHILS # BLD AUTO: 0.05 K/UL — SIGNIFICANT CHANGE UP (ref 0–0.2)
BASOPHILS NFR BLD AUTO: 0.5 % — SIGNIFICANT CHANGE UP (ref 0–2)
BILIRUB SERPL-MCNC: 0.5 MG/DL
BUN SERPL-MCNC: 13 MG/DL
CALCIUM SERPL-MCNC: 9.5 MG/DL
CHLORIDE SERPL-SCNC: 101 MMOL/L
CO2 SERPL-SCNC: 24 MMOL/L
CREAT SERPL-MCNC: 0.8 MG/DL
EGFRCR SERPLBLD CKD-EPI 2021: 90 ML/MIN/1.73M2
EOSINOPHIL # BLD AUTO: 0.22 K/UL — SIGNIFICANT CHANGE UP (ref 0–0.5)
EOSINOPHIL NFR BLD AUTO: 2 % — SIGNIFICANT CHANGE UP (ref 0–6)
GLUCOSE SERPL-MCNC: 113 MG/DL
HCT VFR BLD CALC: 41.1 % — SIGNIFICANT CHANGE UP (ref 39–50)
HGB BLD-MCNC: 13.9 G/DL — SIGNIFICANT CHANGE UP (ref 13–17)
IMM GRANULOCYTES NFR BLD AUTO: 0.2 % — SIGNIFICANT CHANGE UP (ref 0–0.9)
LDH SERPL-CCNC: 180 U/L
LYMPHOCYTES # BLD AUTO: 54.7 % — HIGH (ref 13–44)
LYMPHOCYTES # BLD AUTO: 6.03 K/UL — HIGH (ref 1–3.3)
MCHC RBC-ENTMCNC: 31.2 PG — SIGNIFICANT CHANGE UP (ref 27–34)
MCHC RBC-ENTMCNC: 33.8 G/DL — SIGNIFICANT CHANGE UP (ref 32–36)
MCV RBC AUTO: 92.4 FL — SIGNIFICANT CHANGE UP (ref 80–100)
MONOCYTES # BLD AUTO: 0.68 K/UL — SIGNIFICANT CHANGE UP (ref 0–0.9)
MONOCYTES NFR BLD AUTO: 6.2 % — SIGNIFICANT CHANGE UP (ref 2–14)
NEUTROPHILS # BLD AUTO: 4.02 K/UL — SIGNIFICANT CHANGE UP (ref 1.8–7.4)
NEUTROPHILS NFR BLD AUTO: 36.4 % — LOW (ref 43–77)
NRBC BLD AUTO-RTO: 0 /100 WBCS — SIGNIFICANT CHANGE UP (ref 0–0)
PLATELET # BLD AUTO: 215 K/UL — SIGNIFICANT CHANGE UP (ref 150–400)
POTASSIUM SERPL-SCNC: 4.7 MMOL/L
PROT SERPL-MCNC: 7.1 G/DL
RBC # BLD: 4.45 M/UL — SIGNIFICANT CHANGE UP (ref 4.2–5.8)
RBC # FLD: 12 % — SIGNIFICANT CHANGE UP (ref 10.3–14.5)
SODIUM SERPL-SCNC: 137 MMOL/L
WBC # BLD: 11.02 K/UL — HIGH (ref 3.8–10.5)
WBC # FLD AUTO: 11.02 K/UL — HIGH (ref 3.8–10.5)

## 2025-06-18 PROCEDURE — 99214 OFFICE O/P EST MOD 30 MIN: CPT

## 2025-07-02 ENCOUNTER — APPOINTMENT (OUTPATIENT)
Dept: CARDIOLOGY | Facility: CLINIC | Age: 80
End: 2025-07-02

## (undated) DEVICE — SNARE EXACTO COLD 9MMX230CM

## (undated) DEVICE — BIOPSY FORCEP COLD DISP

## (undated) DEVICE — DRSG CURITY GAUZE SPONGE 4 X 4" 12-PLY NON-STERILE

## (undated) DEVICE — BASIN EMESIS 10IN GRADUATED MAUVE

## (undated) DEVICE — DRSG 2X2

## (undated) DEVICE — ELCTR ECG CONDUCTIVE ADHESIVE

## (undated) DEVICE — DRSG BANDAID 0.75X3"

## (undated) DEVICE — CATH IV SAFE BC 22G X 1" (BLUE)

## (undated) DEVICE — ELCTR GROUNDING PAD ADULT COVIDIEN

## (undated) DEVICE — CONTAINER FORMALIN 10% 20ML

## (undated) DEVICE — TUBING IV SET GRAVITY 3Y 100" MACRO

## (undated) DEVICE — POLY TRAP ETRAP

## (undated) DEVICE — LINE BREATHE SAMPLNG

## (undated) DEVICE — TUBING SUCTION NONCONDUCTIVE 6MM X 12FT

## (undated) DEVICE — GOWN LG

## (undated) DEVICE — TUBING MEDI-VAC W MAXIGRIP CONNECTORS 1/4"X6'

## (undated) DEVICE — CONTAINER FORMALIN 80ML YELLOW

## (undated) DEVICE — LUBRICATING JELLY HR ONE SHOT 3G

## (undated) DEVICE — FACESHIELD FULL VISOR

## (undated) DEVICE — PACK IV START WITH CHG

## (undated) DEVICE — BIOPSY FORCEP RADIAL JAW 4 STANDARD WITH NEEDLE

## (undated) DEVICE — SALIVA EJECTOR (BLUE)